# Patient Record
Sex: FEMALE | Race: WHITE | HISPANIC OR LATINO
[De-identification: names, ages, dates, MRNs, and addresses within clinical notes are randomized per-mention and may not be internally consistent; named-entity substitution may affect disease eponyms.]

---

## 2017-07-11 ENCOUNTER — APPOINTMENT (OUTPATIENT)
Dept: MAMMOGRAPHY | Facility: CLINIC | Age: 47
End: 2017-07-11

## 2017-08-22 ENCOUNTER — APPOINTMENT (OUTPATIENT)
Dept: MAMMOGRAPHY | Facility: CLINIC | Age: 47
End: 2017-08-22
Payer: COMMERCIAL

## 2017-08-22 ENCOUNTER — OUTPATIENT (OUTPATIENT)
Dept: OUTPATIENT SERVICES | Facility: HOSPITAL | Age: 47
LOS: 1 days | End: 2017-08-22
Payer: COMMERCIAL

## 2017-08-22 DIAGNOSIS — Z00.8 ENCOUNTER FOR OTHER GENERAL EXAMINATION: ICD-10-CM

## 2017-08-22 PROCEDURE — G0202: CPT | Mod: 26

## 2017-08-22 PROCEDURE — 77063 BREAST TOMOSYNTHESIS BI: CPT

## 2017-08-22 PROCEDURE — 77063 BREAST TOMOSYNTHESIS BI: CPT | Mod: 26

## 2017-08-22 PROCEDURE — 77067 SCR MAMMO BI INCL CAD: CPT

## 2017-09-05 ENCOUNTER — OUTPATIENT (OUTPATIENT)
Dept: OUTPATIENT SERVICES | Facility: HOSPITAL | Age: 47
LOS: 1 days | End: 2017-09-05
Payer: COMMERCIAL

## 2017-09-05 ENCOUNTER — APPOINTMENT (OUTPATIENT)
Dept: MAMMOGRAPHY | Facility: CLINIC | Age: 47
End: 2017-09-05
Payer: COMMERCIAL

## 2017-09-05 ENCOUNTER — APPOINTMENT (OUTPATIENT)
Dept: ULTRASOUND IMAGING | Facility: CLINIC | Age: 47
End: 2017-09-05
Payer: COMMERCIAL

## 2017-09-05 DIAGNOSIS — Z00.8 ENCOUNTER FOR OTHER GENERAL EXAMINATION: ICD-10-CM

## 2017-09-05 PROCEDURE — 77065 DX MAMMO INCL CAD UNI: CPT

## 2017-09-05 PROCEDURE — 76642 ULTRASOUND BREAST LIMITED: CPT | Mod: 26,LT

## 2017-09-05 PROCEDURE — G0279: CPT

## 2017-09-05 PROCEDURE — G0279: CPT | Mod: 26

## 2017-09-05 PROCEDURE — G0206: CPT | Mod: 26,LT

## 2017-09-05 PROCEDURE — 76642 ULTRASOUND BREAST LIMITED: CPT

## 2018-01-10 ENCOUNTER — TRANSCRIPTION ENCOUNTER (OUTPATIENT)
Age: 48
End: 2018-01-10

## 2018-01-20 ENCOUNTER — TRANSCRIPTION ENCOUNTER (OUTPATIENT)
Age: 48
End: 2018-01-20

## 2018-02-09 ENCOUNTER — OUTPATIENT (OUTPATIENT)
Dept: OUTPATIENT SERVICES | Facility: HOSPITAL | Age: 48
LOS: 1 days | End: 2018-02-09
Payer: COMMERCIAL

## 2018-02-09 ENCOUNTER — APPOINTMENT (OUTPATIENT)
Dept: RADIOLOGY | Facility: CLINIC | Age: 48
End: 2018-02-09
Payer: COMMERCIAL

## 2018-02-09 DIAGNOSIS — Z00.8 ENCOUNTER FOR OTHER GENERAL EXAMINATION: ICD-10-CM

## 2018-02-09 PROCEDURE — 71046 X-RAY EXAM CHEST 2 VIEWS: CPT | Mod: 26

## 2018-02-09 PROCEDURE — 71046 X-RAY EXAM CHEST 2 VIEWS: CPT

## 2018-05-01 ENCOUNTER — APPOINTMENT (OUTPATIENT)
Dept: MAMMOGRAPHY | Facility: CLINIC | Age: 48
End: 2018-05-01
Payer: COMMERCIAL

## 2018-05-01 ENCOUNTER — OUTPATIENT (OUTPATIENT)
Dept: OUTPATIENT SERVICES | Facility: HOSPITAL | Age: 48
LOS: 1 days | End: 2018-05-01
Payer: COMMERCIAL

## 2018-05-01 ENCOUNTER — APPOINTMENT (OUTPATIENT)
Dept: MRI IMAGING | Facility: CLINIC | Age: 48
End: 2018-05-01
Payer: COMMERCIAL

## 2018-05-01 DIAGNOSIS — Z00.8 ENCOUNTER FOR OTHER GENERAL EXAMINATION: ICD-10-CM

## 2018-05-01 PROCEDURE — G0279: CPT | Mod: 26

## 2018-05-01 PROCEDURE — 77065 DX MAMMO INCL CAD UNI: CPT | Mod: 26,LT

## 2018-05-01 PROCEDURE — G0279: CPT

## 2018-05-01 PROCEDURE — 72141 MRI NECK SPINE W/O DYE: CPT | Mod: 26

## 2018-05-01 PROCEDURE — 77065 DX MAMMO INCL CAD UNI: CPT

## 2018-05-01 PROCEDURE — 72141 MRI NECK SPINE W/O DYE: CPT

## 2018-09-14 ENCOUNTER — RESULT REVIEW (OUTPATIENT)
Age: 48
End: 2018-09-14

## 2018-12-06 ENCOUNTER — APPOINTMENT (OUTPATIENT)
Dept: ULTRASOUND IMAGING | Facility: CLINIC | Age: 48
End: 2018-12-06
Payer: COMMERCIAL

## 2018-12-06 ENCOUNTER — OUTPATIENT (OUTPATIENT)
Dept: OUTPATIENT SERVICES | Facility: HOSPITAL | Age: 48
LOS: 1 days | End: 2018-12-06
Payer: COMMERCIAL

## 2018-12-06 ENCOUNTER — APPOINTMENT (OUTPATIENT)
Dept: MAMMOGRAPHY | Facility: CLINIC | Age: 48
End: 2018-12-06
Payer: COMMERCIAL

## 2018-12-06 DIAGNOSIS — Z00.8 ENCOUNTER FOR OTHER GENERAL EXAMINATION: ICD-10-CM

## 2018-12-06 PROCEDURE — 76856 US EXAM PELVIC COMPLETE: CPT | Mod: 26

## 2018-12-06 PROCEDURE — 76856 US EXAM PELVIC COMPLETE: CPT

## 2018-12-06 PROCEDURE — 77066 DX MAMMO INCL CAD BI: CPT | Mod: 26

## 2018-12-06 PROCEDURE — G0279: CPT | Mod: 26

## 2018-12-06 PROCEDURE — 76830 TRANSVAGINAL US NON-OB: CPT

## 2018-12-06 PROCEDURE — G0279: CPT

## 2018-12-06 PROCEDURE — 76641 ULTRASOUND BREAST COMPLETE: CPT | Mod: 26,50

## 2018-12-06 PROCEDURE — 76830 TRANSVAGINAL US NON-OB: CPT | Mod: 26

## 2018-12-06 PROCEDURE — 77066 DX MAMMO INCL CAD BI: CPT

## 2018-12-06 PROCEDURE — 76641 ULTRASOUND BREAST COMPLETE: CPT

## 2019-10-10 ENCOUNTER — RESULT REVIEW (OUTPATIENT)
Age: 49
End: 2019-10-10

## 2019-10-19 ENCOUNTER — OUTPATIENT (OUTPATIENT)
Dept: OUTPATIENT SERVICES | Facility: HOSPITAL | Age: 49
LOS: 1 days | End: 2019-10-19
Payer: COMMERCIAL

## 2019-10-19 ENCOUNTER — APPOINTMENT (OUTPATIENT)
Dept: MRI IMAGING | Facility: CLINIC | Age: 49
End: 2019-10-19
Payer: COMMERCIAL

## 2019-10-19 DIAGNOSIS — Z00.8 ENCOUNTER FOR OTHER GENERAL EXAMINATION: ICD-10-CM

## 2019-10-19 PROCEDURE — 73721 MRI JNT OF LWR EXTRE W/O DYE: CPT

## 2019-10-19 PROCEDURE — 73721 MRI JNT OF LWR EXTRE W/O DYE: CPT | Mod: 26,LT

## 2020-03-26 ENCOUNTER — APPOINTMENT (OUTPATIENT)
Dept: ULTRASOUND IMAGING | Facility: CLINIC | Age: 50
End: 2020-03-26
Payer: COMMERCIAL

## 2020-03-26 ENCOUNTER — APPOINTMENT (OUTPATIENT)
Dept: MAMMOGRAPHY | Facility: CLINIC | Age: 50
End: 2020-03-26
Payer: COMMERCIAL

## 2020-03-26 ENCOUNTER — OUTPATIENT (OUTPATIENT)
Dept: OUTPATIENT SERVICES | Facility: HOSPITAL | Age: 50
LOS: 1 days | End: 2020-03-26
Payer: COMMERCIAL

## 2020-03-26 DIAGNOSIS — Z00.8 ENCOUNTER FOR OTHER GENERAL EXAMINATION: ICD-10-CM

## 2020-03-26 PROCEDURE — 77066 DX MAMMO INCL CAD BI: CPT | Mod: 26

## 2020-03-26 PROCEDURE — 76641 ULTRASOUND BREAST COMPLETE: CPT

## 2020-03-26 PROCEDURE — G0279: CPT

## 2020-03-26 PROCEDURE — G0279: CPT | Mod: 26

## 2020-03-26 PROCEDURE — 77066 DX MAMMO INCL CAD BI: CPT

## 2020-03-26 PROCEDURE — 76641 ULTRASOUND BREAST COMPLETE: CPT | Mod: 26,50

## 2020-12-27 ENCOUNTER — TRANSCRIPTION ENCOUNTER (OUTPATIENT)
Age: 50
End: 2020-12-27

## 2020-12-28 ENCOUNTER — OUTPATIENT (OUTPATIENT)
Dept: OUTPATIENT SERVICES | Facility: HOSPITAL | Age: 50
LOS: 1 days | End: 2020-12-28
Payer: COMMERCIAL

## 2020-12-28 DIAGNOSIS — Z20.828 CONTACT WITH AND (SUSPECTED) EXPOSURE TO OTHER VIRAL COMMUNICABLE DISEASES: ICD-10-CM

## 2020-12-28 LAB — SARS-COV-2 RNA SPEC QL NAA+PROBE: DETECTED

## 2020-12-28 PROCEDURE — U0003: CPT

## 2020-12-28 PROCEDURE — C9803: CPT

## 2020-12-29 DIAGNOSIS — Z20.828 CONTACT WITH AND (SUSPECTED) EXPOSURE TO OTHER VIRAL COMMUNICABLE DISEASES: ICD-10-CM

## 2021-02-12 ENCOUNTER — EMERGENCY (EMERGENCY)
Facility: HOSPITAL | Age: 51
LOS: 0 days | Discharge: HOME | End: 2021-02-12
Attending: EMERGENCY MEDICINE
Payer: COMMERCIAL

## 2021-02-12 ENCOUNTER — TRANSCRIPTION ENCOUNTER (OUTPATIENT)
Age: 51
End: 2021-02-12

## 2021-02-12 VITALS
HEART RATE: 84 BPM | TEMPERATURE: 98 F | RESPIRATION RATE: 16 BRPM | OXYGEN SATURATION: 100 % | SYSTOLIC BLOOD PRESSURE: 129 MMHG | DIASTOLIC BLOOD PRESSURE: 75 MMHG

## 2021-02-12 VITALS — DIASTOLIC BLOOD PRESSURE: 70 MMHG | SYSTOLIC BLOOD PRESSURE: 126 MMHG | HEART RATE: 71 BPM | OXYGEN SATURATION: 100 %

## 2021-02-12 DIAGNOSIS — R06.02 SHORTNESS OF BREATH: ICD-10-CM

## 2021-02-12 DIAGNOSIS — R04.2 HEMOPTYSIS: ICD-10-CM

## 2021-02-12 DIAGNOSIS — Z86.16 PERSONAL HISTORY OF COVID-19: ICD-10-CM

## 2021-02-12 LAB
ALBUMIN SERPL ELPH-MCNC: 4.1 G/DL — SIGNIFICANT CHANGE UP (ref 3.3–5)
ALP SERPL-CCNC: 83 U/L — SIGNIFICANT CHANGE UP (ref 40–120)
ALT FLD-CCNC: 60 U/L — SIGNIFICANT CHANGE UP (ref 12–78)
ANION GAP SERPL CALC-SCNC: 4 MMOL/L — LOW (ref 5–17)
APTT BLD: 34 SEC — SIGNIFICANT CHANGE UP (ref 27.5–35.5)
AST SERPL-CCNC: 35 U/L — SIGNIFICANT CHANGE UP (ref 15–37)
BASOPHILS # BLD AUTO: 0.02 K/UL — SIGNIFICANT CHANGE UP (ref 0–0.2)
BASOPHILS NFR BLD AUTO: 0.4 % — SIGNIFICANT CHANGE UP (ref 0–2)
BILIRUB SERPL-MCNC: 0.3 MG/DL — SIGNIFICANT CHANGE UP (ref 0.2–1.2)
BUN SERPL-MCNC: 11 MG/DL — SIGNIFICANT CHANGE UP (ref 7–23)
CALCIUM SERPL-MCNC: 9.2 MG/DL — SIGNIFICANT CHANGE UP (ref 8.5–10.1)
CHLORIDE SERPL-SCNC: 107 MMOL/L — SIGNIFICANT CHANGE UP (ref 96–108)
CO2 SERPL-SCNC: 29 MMOL/L — SIGNIFICANT CHANGE UP (ref 22–31)
CREAT SERPL-MCNC: 0.58 MG/DL — SIGNIFICANT CHANGE UP (ref 0.5–1.3)
EOSINOPHIL # BLD AUTO: 0.37 K/UL — SIGNIFICANT CHANGE UP (ref 0–0.5)
EOSINOPHIL NFR BLD AUTO: 6.9 % — HIGH (ref 0–6)
GLUCOSE SERPL-MCNC: 114 MG/DL — HIGH (ref 70–99)
HCT VFR BLD CALC: 39.9 % — SIGNIFICANT CHANGE UP (ref 34.5–45)
HGB BLD-MCNC: 12.4 G/DL — SIGNIFICANT CHANGE UP (ref 11.5–15.5)
IMM GRANULOCYTES NFR BLD AUTO: 0 % — SIGNIFICANT CHANGE UP (ref 0–1.5)
INR BLD: 1.02 RATIO — SIGNIFICANT CHANGE UP (ref 0.88–1.16)
LYMPHOCYTES # BLD AUTO: 1.49 K/UL — SIGNIFICANT CHANGE UP (ref 1–3.3)
LYMPHOCYTES # BLD AUTO: 27.7 % — SIGNIFICANT CHANGE UP (ref 13–44)
MCHC RBC-ENTMCNC: 26.7 PG — LOW (ref 27–34)
MCHC RBC-ENTMCNC: 31.1 GM/DL — LOW (ref 32–36)
MCV RBC AUTO: 85.8 FL — SIGNIFICANT CHANGE UP (ref 80–100)
MONOCYTES # BLD AUTO: 0.36 K/UL — SIGNIFICANT CHANGE UP (ref 0–0.9)
MONOCYTES NFR BLD AUTO: 6.7 % — SIGNIFICANT CHANGE UP (ref 2–14)
NEUTROPHILS # BLD AUTO: 3.14 K/UL — SIGNIFICANT CHANGE UP (ref 1.8–7.4)
NEUTROPHILS NFR BLD AUTO: 58.3 % — SIGNIFICANT CHANGE UP (ref 43–77)
PLATELET # BLD AUTO: 232 K/UL — SIGNIFICANT CHANGE UP (ref 150–400)
POTASSIUM SERPL-MCNC: 3.6 MMOL/L — SIGNIFICANT CHANGE UP (ref 3.5–5.3)
POTASSIUM SERPL-SCNC: 3.6 MMOL/L — SIGNIFICANT CHANGE UP (ref 3.5–5.3)
PROT SERPL-MCNC: 7.7 GM/DL — SIGNIFICANT CHANGE UP (ref 6–8.3)
PROTHROM AB SERPL-ACNC: 11.8 SEC — SIGNIFICANT CHANGE UP (ref 10.6–13.6)
RBC # BLD: 4.65 M/UL — SIGNIFICANT CHANGE UP (ref 3.8–5.2)
RBC # FLD: 15 % — HIGH (ref 10.3–14.5)
SODIUM SERPL-SCNC: 140 MMOL/L — SIGNIFICANT CHANGE UP (ref 135–145)
WBC # BLD: 5.38 K/UL — SIGNIFICANT CHANGE UP (ref 3.8–10.5)
WBC # FLD AUTO: 5.38 K/UL — SIGNIFICANT CHANGE UP (ref 3.8–10.5)

## 2021-02-12 PROCEDURE — 86901 BLOOD TYPING SEROLOGIC RH(D): CPT

## 2021-02-12 PROCEDURE — 93005 ELECTROCARDIOGRAM TRACING: CPT

## 2021-02-12 PROCEDURE — 71275 CT ANGIOGRAPHY CHEST: CPT | Mod: 26

## 2021-02-12 PROCEDURE — 36415 COLL VENOUS BLD VENIPUNCTURE: CPT

## 2021-02-12 PROCEDURE — 99283 EMERGENCY DEPT VISIT LOW MDM: CPT

## 2021-02-12 PROCEDURE — 99284 EMERGENCY DEPT VISIT MOD MDM: CPT | Mod: 25

## 2021-02-12 PROCEDURE — 93010 ELECTROCARDIOGRAM REPORT: CPT

## 2021-02-12 PROCEDURE — 80053 COMPREHEN METABOLIC PANEL: CPT

## 2021-02-12 PROCEDURE — 85025 COMPLETE CBC W/AUTO DIFF WBC: CPT

## 2021-02-12 PROCEDURE — 86900 BLOOD TYPING SEROLOGIC ABO: CPT

## 2021-02-12 PROCEDURE — 86850 RBC ANTIBODY SCREEN: CPT

## 2021-02-12 PROCEDURE — 71275 CT ANGIOGRAPHY CHEST: CPT

## 2021-02-12 PROCEDURE — 85730 THROMBOPLASTIN TIME PARTIAL: CPT

## 2021-02-12 PROCEDURE — 85610 PROTHROMBIN TIME: CPT

## 2021-02-12 NOTE — ED STATDOCS - TOBACCO USE
Detail Level: Detailed
Quality 110: Preventive Care And Screening: Influenza Immunization: Influenza immunization was not ordered or administered, reason not given
Quality 137: Melanoma: Continuity Of Care - Recall System: Patient information entered into a recall system that includes: target date for the next exam specified AND a process to follow up with patients regarding missed or unscheduled appointments
Quality 224: Stage 0-Iic Melanoma: Overutilization Of Imaging Studies For Only Stage 0-Iic Melanoma: Patient had one or more of the following imaging studies (chest X-ray, CT, Ultrasound, MRI, PET, nuclear medicine scan), for a clinical trial enrollment, ordered by another provider or other system reasons
Quality 138: Melanoma: Coordination Of Care: A treatment plan was communicated to the physicians providing continuing care within one month of diagnosis outlining: diagnosis, tumor thickness and a plan for surgery or alternate care.
Unknown if ever smoked

## 2021-02-12 NOTE — ED ADULT NURSE NOTE - OBJECTIVE STATEMENT
Pt c/o having 1 episode of vomiting blood yesterday. Pt with no complaints at this time, denies fever, chest pain, shortness of breath, dizziness. Pt alert and oriented x4, able to ambulate without assistance.

## 2021-02-12 NOTE — ED STATDOCS - PATIENT PORTAL LINK FT
You can access the FollowMyHealth Patient Portal offered by [facility] by registering at the following website: [email or web address]. By joining [facility]’s FollowMyHealth portal, you will also be able to view your health information using other applications (apps) compatible with our system.

## 2021-02-12 NOTE — ED STATDOCS - CARE PROVIDER_API CALL
TANJA Huitron (DO)  Pulmonary Disease; Sleep Medicine  [address] [place] [address]  Phone: [phone]  Fax: [phone]  Follow Up Time:

## 2021-02-12 NOTE — ED STATDOCS - NSFOLLOWUPINSTRUCTIONS_ED_ALL_ED_FT
Hemoptisis    Hemoptysis       Hemoptisis se refiere a expulsar deedee al toser. Puede ser leve o grave. Si tiene hemoptisis leve, podría expulsar mucosidad (esputo) y saliva con vetas de deedee al toser cuando tiene aliza infección en la nariz, la garganta o los pulmones (vías respiratorias). Expulsar 1 o 2 tazas (de 240 a 480 ml) de deedee al toser en el transcurso de 24 horas (hemoptisis masiva) es un sb de emergencia médica.  La causa más frecuente de la hemoptisis es aliza infección en las vías respiratorias, bhanu bronquitis o neumonía. Otras causas frecuentes incluyen lo siguiente:  •Un tumor pulmonar o un tumor en las vías respiratorias superiores.      •Aliza afección que dañe los grandes conductos de aire (bronquiectasia).      •Un coágulo de deedee en los pulmones (embolia pulmonar).      •Aliza afección que no permita que la deedee coagule con normalidad.      •Aspiración (inhalación) de un cuerpo extraño pequeño.      Algunas veces la causa no se conoce. La hemoptisis puede ser un indicio de aliza afección leve o grave, por lo que es importante que consulte con khan médico.      Siga estas indicaciones en khan casa:    •Controle khan afección para detectar cualquier cambio.      •Kane los medicamentos de venta devonte y los recetados solamente bhanu se lo haya indicado el médico.      •Si le recetaron un antibiótico, tómelo bhanu se lo haya indicado el médico. No deje de brodie el antibiótico aunque comience a sentirse mejor.      •Retome terry actividades normales bhanu se lo haya indicado el médico. Pregúntele al médico qué actividades son seguras para usted.      • No consuma ningún producto que contenga nicotina o tabaco, bhanu cigarrillos y cigarrillos electrónicos. Si necesita ayuda para dejar de fumar, consulte al médico.      •Concurra a todas las visitas de control bhanu se lo haya indicado el médico. [place] es importante.        Comuníquese con un médico si:    •Tiene fiebre.      •Expulsa esputo con vetas de deedee (teñido de deedee) al toser.        Solicite ayuda de inmediato si:    •Expectora deedee fresca o coágulos de deedee.      •Tiene dificultad para respirar.      •Siente dolor en el pecho.      Esta información no tiene bhanu fin reemplazar el consejo del médico. Asegúrese de hacerle al médico cualquier pregunta que tenga.

## 2021-02-12 NOTE — ED STATDOCS - OBJECTIVE STATEMENT
51 y/o F with PMHx of COVID-19 (+ 12/26/20) presents to the ED sent from  c/o episode of +hemoptysis yesterday with associated +SOB. Has no symptoms today. No edema, chest pain, or fever. Denies TB contacts. Not on AC.

## 2021-02-12 NOTE — ED STATDOCS - CLINICAL SUMMARY MEDICAL DECISION MAKING FREE TEXT BOX
CT negative for PE.  No events in ED.  Non specific airway disease no consolidation.  Return precautions discussed.   Pt. understands need to Follow up with PMD.   # [id number] used for discharge.  Kenia Rodriguez PA-C

## 2021-02-12 NOTE — ED ADULT NURSE NOTE - CHPI ED NUR SYMPTOMS NEG
no back pain/no chest pain/no chills/no congestion/no diaphoresis/no dizziness/no fever/no nausea/no shortness of breath/no syncope

## 2021-02-12 NOTE — ED STATDOCS - PROGRESS NOTE DETAILS
49 y/o F with PMHx of COVID-19 (+ 12/26/20) presents to the ED sent from  c/o episode of +hemoptysis yesterday with associated +SOB. Has no symptoms today. No edema, chest pain, or fever. Denies TB contacts. Not on AC.  Pt. to have labs, CTA rule out PE, reassess.  Kenia Rodriguez PA-C CT negative for PE.  No events in ED.  Non specific CT negative for PE.  No events in ED.  Non specific airway disease no consolidation.  Return precautions discussed.   Pt. understands need to Follow up with PMD.   # [id number] used for discharge.  Kenia Rodriguez PA-C

## 2021-02-20 PROBLEM — U07.1 COVID-19: Chronic | Status: ACTIVE | Noted: 2021-02-12

## 2021-02-26 ENCOUNTER — APPOINTMENT (OUTPATIENT)
Dept: ULTRASOUND IMAGING | Facility: CLINIC | Age: 51
End: 2021-02-26
Payer: COMMERCIAL

## 2021-02-26 ENCOUNTER — OUTPATIENT (OUTPATIENT)
Dept: OUTPATIENT SERVICES | Facility: HOSPITAL | Age: 51
LOS: 1 days | End: 2021-02-26
Payer: COMMERCIAL

## 2021-02-26 DIAGNOSIS — Z00.8 ENCOUNTER FOR OTHER GENERAL EXAMINATION: ICD-10-CM

## 2021-02-26 PROCEDURE — 76830 TRANSVAGINAL US NON-OB: CPT | Mod: 26

## 2021-02-26 PROCEDURE — 76700 US EXAM ABDOM COMPLETE: CPT | Mod: 26

## 2021-02-26 PROCEDURE — 76856 US EXAM PELVIC COMPLETE: CPT | Mod: 26

## 2021-02-26 PROCEDURE — 76830 TRANSVAGINAL US NON-OB: CPT

## 2021-02-26 PROCEDURE — 76700 US EXAM ABDOM COMPLETE: CPT

## 2021-02-26 PROCEDURE — 76856 US EXAM PELVIC COMPLETE: CPT

## 2021-04-02 ENCOUNTER — OUTPATIENT (OUTPATIENT)
Dept: OUTPATIENT SERVICES | Facility: HOSPITAL | Age: 51
LOS: 1 days | End: 2021-04-02

## 2021-04-02 ENCOUNTER — APPOINTMENT (OUTPATIENT)
Dept: MAMMOGRAPHY | Facility: CLINIC | Age: 51
End: 2021-04-02

## 2021-04-02 DIAGNOSIS — Z00.8 ENCOUNTER FOR OTHER GENERAL EXAMINATION: ICD-10-CM

## 2021-11-08 ENCOUNTER — NON-APPOINTMENT (OUTPATIENT)
Age: 51
End: 2021-11-08

## 2021-11-08 ENCOUNTER — APPOINTMENT (OUTPATIENT)
Dept: FAMILY MEDICINE | Facility: CLINIC | Age: 51
End: 2021-11-08
Payer: COMMERCIAL

## 2021-11-08 VITALS
DIASTOLIC BLOOD PRESSURE: 82 MMHG | BODY MASS INDEX: 30.44 KG/M2 | HEIGHT: 58 IN | WEIGHT: 145 LBS | SYSTOLIC BLOOD PRESSURE: 134 MMHG | TEMPERATURE: 98.1 F | HEART RATE: 71 BPM | OXYGEN SATURATION: 99 %

## 2021-11-08 DIAGNOSIS — Z78.9 OTHER SPECIFIED HEALTH STATUS: ICD-10-CM

## 2021-11-08 DIAGNOSIS — Z83.3 FAMILY HISTORY OF DIABETES MELLITUS: ICD-10-CM

## 2021-11-08 PROCEDURE — 90686 IIV4 VACC NO PRSV 0.5 ML IM: CPT

## 2021-11-08 PROCEDURE — G0008: CPT

## 2021-11-08 PROCEDURE — 99204 OFFICE O/P NEW MOD 45 MIN: CPT | Mod: 25

## 2021-11-08 NOTE — HEALTH RISK ASSESSMENT
[1 or 2 (0 pts)] : 1 or 2 (0 points) [Never (0 pts)] : Never (0 points) [No] : In the past 12 months have you used drugs other than those required for medical reasons? No [No falls in past year] : Patient reported no falls in the past year [0] : 2) Feeling down, depressed, or hopeless: Not at all (0) [] : No [Audit-CScore] : 0 [DAF8Iiaaw] : 0

## 2021-12-21 ENCOUNTER — NON-APPOINTMENT (OUTPATIENT)
Age: 51
End: 2021-12-21

## 2021-12-21 ENCOUNTER — APPOINTMENT (OUTPATIENT)
Dept: FAMILY MEDICINE | Facility: CLINIC | Age: 51
End: 2021-12-21
Payer: COMMERCIAL

## 2021-12-21 VITALS
HEIGHT: 58 IN | OXYGEN SATURATION: 100 % | BODY MASS INDEX: 30.23 KG/M2 | TEMPERATURE: 97.9 F | DIASTOLIC BLOOD PRESSURE: 77 MMHG | SYSTOLIC BLOOD PRESSURE: 120 MMHG | HEART RATE: 66 BPM | WEIGHT: 144 LBS

## 2021-12-21 LAB
BILIRUB UR QL STRIP: NORMAL
CLARITY UR: CLEAR
COLLECTION METHOD: NORMAL
GLUCOSE UR-MCNC: NORMAL
HCG UR QL: 0.2 EU/DL
HGB UR QL STRIP.AUTO: NORMAL
KETONES UR-MCNC: NORMAL
LEUKOCYTE ESTERASE UR QL STRIP: NORMAL
NITRITE UR QL STRIP: NORMAL
PH UR STRIP: 5.5
PROT UR STRIP-MCNC: NORMAL
SP GR UR STRIP: 1.02

## 2021-12-21 PROCEDURE — 99173 VISUAL ACUITY SCREEN: CPT

## 2021-12-21 PROCEDURE — 86580 TB INTRADERMAL TEST: CPT

## 2021-12-21 PROCEDURE — 90471 IMMUNIZATION ADMIN: CPT

## 2021-12-21 PROCEDURE — 93000 ELECTROCARDIOGRAM COMPLETE: CPT

## 2021-12-21 PROCEDURE — 36415 COLL VENOUS BLD VENIPUNCTURE: CPT

## 2021-12-21 PROCEDURE — 99396 PREV VISIT EST AGE 40-64: CPT | Mod: 25

## 2021-12-21 PROCEDURE — 92551 PURE TONE HEARING TEST AIR: CPT

## 2021-12-21 PROCEDURE — 81003 URINALYSIS AUTO W/O SCOPE: CPT | Mod: QW

## 2021-12-21 PROCEDURE — 90715 TDAP VACCINE 7 YRS/> IM: CPT

## 2021-12-21 NOTE — HEALTH RISK ASSESSMENT
[Never] : Never [Yes] : Yes [Monthly or less (1 pt)] : Monthly or less (1 point) [1 or 2 (0 pts)] : 1 or 2 (0 points) [Never (0 pts)] : Never (0 points) [No] : In the past 12 months have you used drugs other than those required for medical reasons? No [No falls in past year] : Patient reported no falls in the past year [0] : 2) Feeling down, depressed, or hopeless: Not at all (0) [HIV Test offered] : HIV Test offered [Hepatitis C test offered] : Hepatitis C test offered [With Family] : lives with family [# of Members in Household ___] :  household currently consist of [unfilled] member(s) [Employed] : employed [High School] : high school [] :  [# Of Children ___] : has [unfilled] children [Sexually Active] : sexually active [Feels Safe at Home] : Feels safe at home [Fully functional (bathing, dressing, toileting, transferring, walking, feeding)] : Fully functional (bathing, dressing, toileting, transferring, walking, feeding) [Fully functional (using the telephone, shopping, preparing meals, housekeeping, doing laundry, using] : Fully functional and needs no help or supervision to perform IADLs (using the telephone, shopping, preparing meals, housekeeping, doing laundry, using transportation, managing medications and managing finances) [Reports changes in vision] : Reports changes in vision [Reports normal functional visual acuity (ie: able to read med bottle)] : Reports normal functional visual acuity [Smoke Detector] : smoke detector [Carbon Monoxide Detector] : carbon monoxide detector [Safety elements used in home] : safety elements used in home [Seat Belt] :  uses seat belt [Travel to Developing Areas] : travel to developing areas [Very Good] : ~his/her~  mood as very good [Audit-CScore] : 1 [PMK8Brzhj] : 0 [Patient reported mammogram was normal] : Patient reported mammogram was normal [Patient reported PAP Smear was normal] : Patient reported PAP Smear was normal [Patient reported colonoscopy was normal] : Patient reported colonoscopy was normal [Change in mental status noted] : No change in mental status noted [Language] : denies difficulty with language [Behavior] : denies difficulty with behavior [Learning/Retaining New Information] : denies difficulty learning/retaining new information [Handling Complex Tasks] : denies difficulty handling complex tasks [Reasoning] : denies difficulty with reasoning [Spatial Ability and Orientation] : denies difficulty with spatial ability and orientation [Reports changes in hearing] : Reports no changes in hearing [Reports changes in dental health] : Reports no changes in dental health [Guns at Home] : no guns at home [Sunscreen] : does not use sunscreen [TB Exposure] : is not being exposed to tuberculosis [Caregiver Concerns] : does not have caregiver concerns [MammogramDate] : 01/20 [PapSmearDate] : 01/20 [ColonoscopyDate] : 01/17 [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

## 2021-12-21 NOTE — PHYSICAL EXAM
[20/___] : left eye 20/[unfilled] [Normal] : affect was normal and insight and judgment were intact [FreeTextEntry1] : Right\par 0.5-40\par 1-35\par 2-25\par 4-25\par \par \par Left\par 0.5-0\par 1-45\par 2-30\par 4-30\par

## 2021-12-21 NOTE — COUNSELING
[Fall prevention counseling provided] : Fall prevention counseling provided [Behavioral health counseling provided] : Behavioral health counseling provided [Potential consequences of obesity discussed] : Potential consequences of obesity discussed [Benefits of weight loss discussed] : Benefits of weight loss discussed [Encouraged to increase physical activity] : Encouraged to increase physical activity [Encouraged to use exercise tracking device] : Encouraged to use exercise tracking device [Good understanding] : Patient has a good understanding of disease, goals and obesity follow-up plan

## 2021-12-22 LAB
ALBUMIN SERPL ELPH-MCNC: 4.8 G/DL
ALP BLD-CCNC: 84 U/L
ALT SERPL-CCNC: 22 U/L
ANION GAP SERPL CALC-SCNC: 11 MMOL/L
AST SERPL-CCNC: 22 U/L
BASOPHILS # BLD AUTO: 0.04 K/UL
BASOPHILS NFR BLD AUTO: 0.8 %
BILIRUB SERPL-MCNC: 0.3 MG/DL
BUN SERPL-MCNC: 10 MG/DL
CALCIUM SERPL-MCNC: 9.3 MG/DL
CHLORIDE SERPL-SCNC: 104 MMOL/L
CHOLEST SERPL-MCNC: 157 MG/DL
CO2 SERPL-SCNC: 26 MMOL/L
CREAT SERPL-MCNC: 0.62 MG/DL
EOSINOPHIL # BLD AUTO: 0.67 K/UL
EOSINOPHIL NFR BLD AUTO: 13.6 %
GLUCOSE SERPL-MCNC: 93 MG/DL
HCT VFR BLD CALC: 42 %
HCV AB SER QL: NONREACTIVE
HCV S/CO RATIO: 0.09 S/CO
HDLC SERPL-MCNC: 46 MG/DL
HGB BLD-MCNC: 12.6 G/DL
HIV1+2 AB SPEC QL IA.RAPID: NONREACTIVE
IMM GRANULOCYTES NFR BLD AUTO: 0.2 %
LDLC SERPL CALC-MCNC: 94 MG/DL
LYMPHOCYTES # BLD AUTO: 1.21 K/UL
LYMPHOCYTES NFR BLD AUTO: 24.5 %
MAN DIFF?: NORMAL
MCHC RBC-ENTMCNC: 26.2 PG
MCHC RBC-ENTMCNC: 30 GM/DL
MCV RBC AUTO: 87.3 FL
MONOCYTES # BLD AUTO: 0.28 K/UL
MONOCYTES NFR BLD AUTO: 5.7 %
NEUTROPHILS # BLD AUTO: 2.72 K/UL
NEUTROPHILS NFR BLD AUTO: 55.2 %
NONHDLC SERPL-MCNC: 111 MG/DL
PLATELET # BLD AUTO: 236 K/UL
POTASSIUM SERPL-SCNC: 4 MMOL/L
PROT SERPL-MCNC: 7.1 G/DL
RBC # BLD: 4.81 M/UL
RBC # FLD: 14.1 %
SODIUM SERPL-SCNC: 141 MMOL/L
T3FREE SERPL-MCNC: 3.93 PG/ML
T4 FREE SERPL-MCNC: 1.3 NG/DL
TRIGL SERPL-MCNC: 85 MG/DL
TSH SERPL-ACNC: 1.06 UIU/ML
WBC # FLD AUTO: 4.93 K/UL

## 2022-01-17 ENCOUNTER — OUTPATIENT (OUTPATIENT)
Dept: OUTPATIENT SERVICES | Facility: HOSPITAL | Age: 52
LOS: 1 days | End: 2022-01-17
Payer: COMMERCIAL

## 2022-01-17 ENCOUNTER — APPOINTMENT (OUTPATIENT)
Dept: MAMMOGRAPHY | Facility: CLINIC | Age: 52
End: 2022-01-17
Payer: COMMERCIAL

## 2022-01-17 ENCOUNTER — RESULT REVIEW (OUTPATIENT)
Age: 52
End: 2022-01-17

## 2022-01-17 DIAGNOSIS — Z00.00 ENCOUNTER FOR GENERAL ADULT MEDICAL EXAMINATION WITHOUT ABNORMAL FINDINGS: ICD-10-CM

## 2022-01-17 DIAGNOSIS — Z00.8 ENCOUNTER FOR OTHER GENERAL EXAMINATION: ICD-10-CM

## 2022-01-17 PROCEDURE — 77063 BREAST TOMOSYNTHESIS BI: CPT

## 2022-01-17 PROCEDURE — 77063 BREAST TOMOSYNTHESIS BI: CPT | Mod: 26

## 2022-01-17 PROCEDURE — 77067 SCR MAMMO BI INCL CAD: CPT

## 2022-01-17 PROCEDURE — 77067 SCR MAMMO BI INCL CAD: CPT | Mod: 26

## 2022-01-28 ENCOUNTER — RX RENEWAL (OUTPATIENT)
Age: 52
End: 2022-01-28

## 2022-05-08 ENCOUNTER — RX RENEWAL (OUTPATIENT)
Age: 52
End: 2022-05-08

## 2022-05-11 ENCOUNTER — RX RENEWAL (OUTPATIENT)
Age: 52
End: 2022-05-11

## 2022-05-25 ENCOUNTER — APPOINTMENT (OUTPATIENT)
Dept: FAMILY MEDICINE | Facility: CLINIC | Age: 52
End: 2022-05-25
Payer: COMMERCIAL

## 2022-05-25 VITALS
OXYGEN SATURATION: 100 % | WEIGHT: 144 LBS | TEMPERATURE: 98 F | SYSTOLIC BLOOD PRESSURE: 133 MMHG | BODY MASS INDEX: 30.1 KG/M2 | DIASTOLIC BLOOD PRESSURE: 82 MMHG | HEART RATE: 75 BPM

## 2022-05-25 PROCEDURE — 99213 OFFICE O/P EST LOW 20 MIN: CPT

## 2022-05-25 NOTE — HISTORY OF PRESENT ILLNESS
[Hypertension] : Hypertension [FreeTextEntry6] : pt is here for med renewal [Does not check BP] : The patient is not checking blood pressure [<140/90] : Target blood pressure is <140/90 [Target goal met] : BP target goal met

## 2022-06-05 ENCOUNTER — NON-APPOINTMENT (OUTPATIENT)
Age: 52
End: 2022-06-05

## 2022-07-24 ENCOUNTER — NON-APPOINTMENT (OUTPATIENT)
Age: 52
End: 2022-07-24

## 2022-07-26 ENCOUNTER — NON-APPOINTMENT (OUTPATIENT)
Age: 52
End: 2022-07-26

## 2022-07-31 ENCOUNTER — NON-APPOINTMENT (OUTPATIENT)
Age: 52
End: 2022-07-31

## 2022-08-11 ENCOUNTER — APPOINTMENT (OUTPATIENT)
Dept: FAMILY MEDICINE | Facility: CLINIC | Age: 52
End: 2022-08-11

## 2022-08-11 VITALS
DIASTOLIC BLOOD PRESSURE: 80 MMHG | WEIGHT: 144 LBS | SYSTOLIC BLOOD PRESSURE: 122 MMHG | HEIGHT: 58 IN | BODY MASS INDEX: 30.23 KG/M2

## 2022-08-11 PROCEDURE — 99213 OFFICE O/P EST LOW 20 MIN: CPT

## 2022-08-11 NOTE — ASSESSMENT
[FreeTextEntry1] : F/u in 6-12 mos for dose number 2\par Routine travel and water precautions discussed

## 2022-08-11 NOTE — HISTORY OF PRESENT ILLNESS
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

## 2022-09-08 ENCOUNTER — APPOINTMENT (OUTPATIENT)
Dept: FAMILY MEDICINE | Facility: CLINIC | Age: 52
End: 2022-09-08

## 2022-10-25 NOTE — ED STATDOCS - SKIN, MLM
skin normal color for race, warm, dry and intact.
n/a at this time pt Elevated BP and R nare bleed minimal/unable to perform

## 2022-11-07 ENCOUNTER — RX RENEWAL (OUTPATIENT)
Age: 52
End: 2022-11-07

## 2022-11-21 ENCOUNTER — NON-APPOINTMENT (OUTPATIENT)
Age: 52
End: 2022-11-21

## 2022-12-06 ENCOUNTER — APPOINTMENT (OUTPATIENT)
Dept: ORTHOPEDIC SURGERY | Facility: CLINIC | Age: 52
End: 2022-12-06

## 2022-12-06 ENCOUNTER — NON-APPOINTMENT (OUTPATIENT)
Age: 52
End: 2022-12-06

## 2022-12-06 PROCEDURE — 99204 OFFICE O/P NEW MOD 45 MIN: CPT

## 2022-12-06 NOTE — HISTORY OF PRESENT ILLNESS
[de-identified] : Patient presents with left knee pain.  States it occurred on around 1120 when she was playing with her son.  States her knee hit into the stairs.  Since then has gotten much better.  Still complaining of pain however around the knee.  Does state it does radiate down her legs to her foot.  Also has numbness and tingling around the knee.  Does complain of some swelling around the knee.  Takes Aleve which seems to help a bit.  Denies any prior injuries or treatments.  Past medical significant for hypertension.

## 2022-12-06 NOTE — PHYSICAL EXAM
[de-identified] : General Appearance: normal without acute distress\par Mental: Alert and oriented x 3\par Psych/affect: appropriate, cooperative\par Gait: [normal] gait\par \par Nontender palpation low back\par Left hip no pain with internal rotation, full range of motion\par \par Left knee\par Inspection: no effusion, no erythema.\par Wounds: none.\par Alignment: neutral.\par Palpation: Tender palpation medial joint line.\par ROM active (in degrees): 0-[120] without crepitus or pain through the arc of motion.  Positive straight leg raise\par Ligamentous laxity: stable to varus stress test, stable to valgus stress test. Negative Lachman's test\par Meniscal Test: Positive McMurrays, mildly positive Kassandra.\par Muscle Test: good quad strength. [de-identified] : Left knee x-rays taken in the hospital reviewed interpreted–mild medial joint space narrowing with small osteophytes, mild osteoarthritis

## 2022-12-06 NOTE — DISCUSSION/SUMMARY
[de-identified] : Left lumbar radiculopathy, left knee pain, possible meniscus tear\par \par Extensive conversation about this issue with the patient.  Due to the constellation of symptoms, I prescribed a Medrol Dosepak to try to calm the acute inflammation possibly coming from her back as well.  If this pain continues with anti-inflammatories after a month, she should follow-up and we will reevaluate to see if has become more isolated.  Encouraged light physical activity.  All questions answered.

## 2022-12-08 ENCOUNTER — APPOINTMENT (OUTPATIENT)
Dept: FAMILY MEDICINE | Facility: CLINIC | Age: 52
End: 2022-12-08

## 2022-12-08 VITALS
SYSTOLIC BLOOD PRESSURE: 110 MMHG | TEMPERATURE: 98.1 F | DIASTOLIC BLOOD PRESSURE: 60 MMHG | HEART RATE: 83 BPM | WEIGHT: 144 LBS | BODY MASS INDEX: 30.23 KG/M2 | OXYGEN SATURATION: 98 % | HEIGHT: 58 IN

## 2022-12-08 PROCEDURE — 90686 IIV4 VACC NO PRSV 0.5 ML IM: CPT

## 2022-12-08 PROCEDURE — 99213 OFFICE O/P EST LOW 20 MIN: CPT | Mod: 25

## 2022-12-08 PROCEDURE — G0008: CPT

## 2022-12-08 RX ORDER — NAPROXEN 500 MG/1
500 TABLET ORAL
Qty: 20 | Refills: 0 | Status: DISCONTINUED | COMMUNITY
Start: 2022-11-22

## 2022-12-08 RX ORDER — NIRMATRELVIR AND RITONAVIR 300-100 MG
20 X 150 MG & KIT ORAL
Qty: 30 | Refills: 0 | Status: DISCONTINUED | COMMUNITY
Start: 2022-07-27

## 2022-12-08 RX ORDER — METHYLPREDNISOLONE 4 MG/1
4 TABLET ORAL
Qty: 1 | Refills: 0 | Status: DISCONTINUED | COMMUNITY
Start: 2022-12-06 | End: 2022-12-08

## 2022-12-08 NOTE — HISTORY OF PRESENT ILLNESS
[FreeTextEntry6] : Pt is here for BP check. [<140/90] : Target blood pressure is <140/90 [Target goal met] : BP target goal met

## 2023-01-21 ENCOUNTER — NON-APPOINTMENT (OUTPATIENT)
Age: 53
End: 2023-01-21

## 2023-01-21 ENCOUNTER — APPOINTMENT (OUTPATIENT)
Dept: FAMILY MEDICINE | Facility: CLINIC | Age: 53
End: 2023-01-21
Payer: COMMERCIAL

## 2023-01-21 VITALS
DIASTOLIC BLOOD PRESSURE: 70 MMHG | SYSTOLIC BLOOD PRESSURE: 110 MMHG | OXYGEN SATURATION: 95 % | HEIGHT: 58 IN | BODY MASS INDEX: 28.76 KG/M2 | HEART RATE: 75 BPM | TEMPERATURE: 97.9 F | WEIGHT: 137 LBS

## 2023-01-21 DIAGNOSIS — Z23 ENCOUNTER FOR IMMUNIZATION: ICD-10-CM

## 2023-01-21 PROCEDURE — 99396 PREV VISIT EST AGE 40-64: CPT | Mod: 25

## 2023-01-21 PROCEDURE — 81003 URINALYSIS AUTO W/O SCOPE: CPT | Mod: QW

## 2023-01-21 PROCEDURE — 93000 ELECTROCARDIOGRAM COMPLETE: CPT

## 2023-01-21 PROCEDURE — 36415 COLL VENOUS BLD VENIPUNCTURE: CPT

## 2023-01-21 PROCEDURE — 92551 PURE TONE HEARING TEST AIR: CPT

## 2023-01-21 NOTE — HEALTH RISK ASSESSMENT
[Never] : Never [1 or 2 (0 pts)] : 1 or 2 (0 points) [Never (0 pts)] : Never (0 points) [No] : In the past 12 months have you used drugs other than those required for medical reasons? No [No falls in past year] : Patient reported no falls in the past year [0] : 2) Feeling down, depressed, or hopeless: Not at all (0) [PHQ-2 Negative - No further assessment needed] : PHQ-2 Negative - No further assessment needed [Patient reported mammogram was normal] : Patient reported mammogram was normal [HIV test declined] : HIV test declined [Hepatitis C test declined] : Hepatitis C test declined [With Family] : lives with family [# of Members in Household ___] :  household currently consist of [unfilled] member(s) [Employed] : employed [College] : College [] :  [# Of Children ___] : has [unfilled] children [Sexually Active] : sexually active [Feels Safe at Home] : Feels safe at home [Fully functional (bathing, dressing, toileting, transferring, walking, feeding)] : Fully functional (bathing, dressing, toileting, transferring, walking, feeding) [Fully functional (using the telephone, shopping, preparing meals, housekeeping, doing laundry, using] : Fully functional and needs no help or supervision to perform IADLs (using the telephone, shopping, preparing meals, housekeeping, doing laundry, using transportation, managing medications and managing finances) [Reports normal functional visual acuity (ie: able to read med bottle)] : Reports normal functional visual acuity [Smoke Detector] : smoke detector [Carbon Monoxide Detector] : carbon monoxide detector [Safety elements used in home] : safety elements used in home [Seat Belt] :  uses seat belt [Travel to Developing Areas] : travel to developing areas [Very Good] : ~his/her~  mood as very good [Audit-CScore] : 0 [LDX8Copgr] : 0 [Change in mental status noted] : No change in mental status noted [Language] : denies difficulty with language [Behavior] : denies difficulty with behavior [Learning/Retaining New Information] : denies difficulty learning/retaining new information [Handling Complex Tasks] : denies difficulty handling complex tasks [Reasoning] : denies difficulty with reasoning [Spatial Ability and Orientation] : denies difficulty with spatial ability and orientation [Reports changes in hearing] : Reports no changes in hearing [Reports changes in vision] : Reports no changes in vision [Reports changes in dental health] : Reports no changes in dental health [Guns at Home] : no guns at home [Sunscreen] : does not use sunscreen [TB Exposure] : is not being exposed to tuberculosis [Caregiver Concerns] : does not have caregiver concerns [MammogramDate] : 01/22 [PapSmearComments] : unsure [ColonoscopyComments] : unsure

## 2023-01-21 NOTE — PHYSICAL EXAM
[No Acute Distress] : no acute distress [Well Nourished] : well nourished [Well Developed] : well developed [Well-Appearing] : well-appearing [Normal Sclera/Conjunctiva] : normal sclera/conjunctiva [PERRL] : pupils equal round and reactive to light [EOMI] : extraocular movements intact [Normal Outer Ear/Nose] : the outer ears and nose were normal in appearance [Normal Oropharynx] : the oropharynx was normal [No JVD] : no jugular venous distention [No Lymphadenopathy] : no lymphadenopathy [Supple] : supple [Thyroid Normal, No Nodules] : the thyroid was normal and there were no nodules present [No Respiratory Distress] : no respiratory distress  [No Accessory Muscle Use] : no accessory muscle use [Clear to Auscultation] : lungs were clear to auscultation bilaterally [Normal Rate] : normal rate  [Regular Rhythm] : with a regular rhythm [Normal S1, S2] : normal S1 and S2 [No Murmur] : no murmur heard [No Carotid Bruits] : no carotid bruits [No Abdominal Bruit] : a ~M bruit was not heard ~T in the abdomen [No Varicosities] : no varicosities [Pedal Pulses Present] : the pedal pulses are present [No Edema] : there was no peripheral edema [No Palpable Aorta] : no palpable aorta [No Extremity Clubbing/Cyanosis] : no extremity clubbing/cyanosis [Soft] : abdomen soft [Non Tender] : non-tender [Non-distended] : non-distended [No Masses] : no abdominal mass palpated [No HSM] : no HSM [Normal Bowel Sounds] : normal bowel sounds [Normal Posterior Cervical Nodes] : no posterior cervical lymphadenopathy [Normal Anterior Cervical Nodes] : no anterior cervical lymphadenopathy [No CVA Tenderness] : no CVA  tenderness [No Spinal Tenderness] : no spinal tenderness [No Joint Swelling] : no joint swelling [Grossly Normal Strength/Tone] : grossly normal strength/tone [No Rash] : no rash [Coordination Grossly Intact] : coordination grossly intact [No Focal Deficits] : no focal deficits [Normal Gait] : normal gait [Deep Tendon Reflexes (DTR)] : deep tendon reflexes were 2+ and symmetric [Normal Affect] : the affect was normal [Normal Insight/Judgement] : insight and judgment were intact [FreeTextEntry1] : .\par Right Ear\par \par 0.5-50\par 1-35\par 2-30\par 4-25\par \par Left Ear\par \par 0.5-50\par 1-30\par 2-20\par 4-30\par

## 2023-01-22 LAB
ALBUMIN SERPL ELPH-MCNC: 4.6 G/DL
ALP BLD-CCNC: 95 U/L
ALT SERPL-CCNC: 16 U/L
ANION GAP SERPL CALC-SCNC: 11 MMOL/L
AST SERPL-CCNC: 14 U/L
BASOPHILS # BLD AUTO: 0.05 K/UL
BASOPHILS NFR BLD AUTO: 1 %
BILIRUB SERPL-MCNC: 0.2 MG/DL
BILIRUB UR QL STRIP: NORMAL
BUN SERPL-MCNC: 10 MG/DL
CALCIUM SERPL-MCNC: 9.6 MG/DL
CHLORIDE SERPL-SCNC: 102 MMOL/L
CHOLEST SERPL-MCNC: 154 MG/DL
CLARITY UR: CLEAR
CO2 SERPL-SCNC: 25 MMOL/L
COLLECTION METHOD: NORMAL
CREAT SERPL-MCNC: 0.54 MG/DL
EGFR: 110 ML/MIN/1.73M2
EOSINOPHIL # BLD AUTO: 0.73 K/UL
EOSINOPHIL NFR BLD AUTO: 14.8 %
GLUCOSE SERPL-MCNC: 88 MG/DL
GLUCOSE UR-MCNC: NORMAL
HCG UR QL: 0.2 EU/DL
HCT VFR BLD CALC: 40.5 %
HDLC SERPL-MCNC: 51 MG/DL
HGB BLD-MCNC: 12.8 G/DL
HGB UR QL STRIP.AUTO: ABNORMAL
IMM GRANULOCYTES NFR BLD AUTO: 0.2 %
KETONES UR-MCNC: NORMAL
LDLC SERPL CALC-MCNC: 83 MG/DL
LEUKOCYTE ESTERASE UR QL STRIP: ABNORMAL
LYMPHOCYTES # BLD AUTO: 1.31 K/UL
LYMPHOCYTES NFR BLD AUTO: 26.5 %
MAN DIFF?: NORMAL
MCHC RBC-ENTMCNC: 26.8 PG
MCHC RBC-ENTMCNC: 31.6 GM/DL
MCV RBC AUTO: 84.7 FL
MONOCYTES # BLD AUTO: 0.29 K/UL
MONOCYTES NFR BLD AUTO: 5.9 %
NEUTROPHILS # BLD AUTO: 2.55 K/UL
NEUTROPHILS NFR BLD AUTO: 51.6 %
NITRITE UR QL STRIP: NORMAL
NONHDLC SERPL-MCNC: 103 MG/DL
PH UR STRIP: 5.5
PLATELET # BLD AUTO: 222 K/UL
POTASSIUM SERPL-SCNC: 4 MMOL/L
PROT SERPL-MCNC: 7 G/DL
PROT UR STRIP-MCNC: NORMAL
RBC # BLD: 4.78 M/UL
RBC # FLD: 13.7 %
SODIUM SERPL-SCNC: 139 MMOL/L
SP GR UR STRIP: 1.01
T3FREE SERPL-MCNC: 3.44 PG/ML
T4 FREE SERPL-MCNC: 1.2 NG/DL
TRIGL SERPL-MCNC: 99 MG/DL
TSH SERPL-ACNC: 1.37 UIU/ML
WBC # FLD AUTO: 4.94 K/UL

## 2023-03-29 ENCOUNTER — APPOINTMENT (OUTPATIENT)
Dept: ULTRASOUND IMAGING | Facility: CLINIC | Age: 53
End: 2023-03-29
Payer: COMMERCIAL

## 2023-03-29 ENCOUNTER — APPOINTMENT (OUTPATIENT)
Dept: MAMMOGRAPHY | Facility: CLINIC | Age: 53
End: 2023-03-29
Payer: COMMERCIAL

## 2023-03-29 ENCOUNTER — OUTPATIENT (OUTPATIENT)
Dept: OUTPATIENT SERVICES | Facility: HOSPITAL | Age: 53
LOS: 1 days | End: 2023-03-29
Payer: COMMERCIAL

## 2023-03-29 DIAGNOSIS — N76.0 ACUTE VAGINITIS: ICD-10-CM

## 2023-03-29 DIAGNOSIS — R76.0 RAISED ANTIBODY TITER: ICD-10-CM

## 2023-03-29 PROCEDURE — 76856 US EXAM PELVIC COMPLETE: CPT | Mod: 26

## 2023-03-29 PROCEDURE — 77063 BREAST TOMOSYNTHESIS BI: CPT | Mod: 26

## 2023-03-29 PROCEDURE — 77067 SCR MAMMO BI INCL CAD: CPT | Mod: 26

## 2023-03-29 PROCEDURE — 76830 TRANSVAGINAL US NON-OB: CPT | Mod: 26

## 2023-03-29 PROCEDURE — 77067 SCR MAMMO BI INCL CAD: CPT

## 2023-03-29 PROCEDURE — 76856 US EXAM PELVIC COMPLETE: CPT

## 2023-03-29 PROCEDURE — 77063 BREAST TOMOSYNTHESIS BI: CPT

## 2023-03-29 PROCEDURE — 76830 TRANSVAGINAL US NON-OB: CPT

## 2023-05-01 ENCOUNTER — RX RENEWAL (OUTPATIENT)
Age: 53
End: 2023-05-01

## 2023-05-03 ENCOUNTER — NON-APPOINTMENT (OUTPATIENT)
Age: 53
End: 2023-05-03

## 2023-05-03 ENCOUNTER — RX RENEWAL (OUTPATIENT)
Age: 53
End: 2023-05-03

## 2023-05-18 ENCOUNTER — APPOINTMENT (OUTPATIENT)
Dept: FAMILY MEDICINE | Facility: CLINIC | Age: 53
End: 2023-05-18
Payer: COMMERCIAL

## 2023-05-18 VITALS
HEART RATE: 87 BPM | OXYGEN SATURATION: 97 % | TEMPERATURE: 98.8 F | WEIGHT: 137 LBS | HEIGHT: 58 IN | SYSTOLIC BLOOD PRESSURE: 102 MMHG | DIASTOLIC BLOOD PRESSURE: 60 MMHG | BODY MASS INDEX: 28.76 KG/M2

## 2023-05-18 DIAGNOSIS — M54.16 RADICULOPATHY, LUMBAR REGION: ICD-10-CM

## 2023-05-18 DIAGNOSIS — F51.01 PRIMARY INSOMNIA: ICD-10-CM

## 2023-05-18 PROCEDURE — 99214 OFFICE O/P EST MOD 30 MIN: CPT

## 2023-05-18 NOTE — HISTORY OF PRESENT ILLNESS
[Hypertension] : Hypertension [FreeTextEntry6] : Pt is here for BP check. [Does not check BP] : The patient is not checking blood pressure [<140/90] : Target blood pressure is <140/90 [Target goal met] : BP target goal met

## 2023-06-13 ENCOUNTER — APPOINTMENT (OUTPATIENT)
Dept: GASTROENTEROLOGY | Facility: CLINIC | Age: 53
End: 2023-06-13
Payer: COMMERCIAL

## 2023-06-13 VITALS
HEIGHT: 58 IN | HEART RATE: 70 BPM | WEIGHT: 145 LBS | DIASTOLIC BLOOD PRESSURE: 74 MMHG | SYSTOLIC BLOOD PRESSURE: 118 MMHG | BODY MASS INDEX: 30.44 KG/M2

## 2023-06-13 PROCEDURE — 99203 OFFICE O/P NEW LOW 30 MIN: CPT

## 2023-06-13 NOTE — ASSESSMENT
[FreeTextEntry1] : Plan:\par Since pt has + FMH of CRC and has not had screening colonoscopy in 10 years, would recommend. Risks versus benefits as well as instructions reviewed, pt agrees to planned procedure. All questions answered, pt expressed understanding. I have spent 30 minutes on this encounter.

## 2023-06-13 NOTE — HISTORY OF PRESENT ILLNESS
[FreeTextEntry1] : Gayle Mackey is a 52 year old female PMH HTN presents today for consult for screening colonoscopy. Notes she has + FMH of CRC in maternal aunt. Had previous colonoscopy over 10 years ago with another physician. Denies bowel complaints, typically has bowel movements regularly without significant straining or overt bleeding such as melena or hematochezia. Denies upper GI symptoms such as GERD, nausea, vomiting, or dysphagia. Denies unintentional weight loss.

## 2023-06-13 NOTE — REASON FOR VISIT
[Initial Evaluation] : an initial evaluation [[employer] ] : provided by [employer]   [Time Spent: ____ minutes] : Total time spent using  services: [unfilled] minutes. The patient's primary language is not English thus required  services. [Interpreters_IDNumber] : [id number] [Interpreters_FullName] : Kimberly [TWNoteComboBox1] : Slovak

## 2023-06-26 ENCOUNTER — RX RENEWAL (OUTPATIENT)
Age: 53
End: 2023-06-26

## 2023-07-25 ENCOUNTER — RX RENEWAL (OUTPATIENT)
Age: 53
End: 2023-07-25

## 2023-08-27 ENCOUNTER — RX RENEWAL (OUTPATIENT)
Age: 53
End: 2023-08-27

## 2023-09-07 ENCOUNTER — APPOINTMENT (OUTPATIENT)
Dept: FAMILY MEDICINE | Facility: CLINIC | Age: 53
End: 2023-09-07
Payer: COMMERCIAL

## 2023-09-07 VITALS
DIASTOLIC BLOOD PRESSURE: 78 MMHG | BODY MASS INDEX: 30.31 KG/M2 | HEART RATE: 72 BPM | TEMPERATURE: 98.8 F | WEIGHT: 145 LBS | OXYGEN SATURATION: 98 % | SYSTOLIC BLOOD PRESSURE: 120 MMHG

## 2023-09-07 PROCEDURE — 36415 COLL VENOUS BLD VENIPUNCTURE: CPT

## 2023-09-07 PROCEDURE — 99213 OFFICE O/P EST LOW 20 MIN: CPT | Mod: 25

## 2023-09-09 NOTE — HISTORY OF PRESENT ILLNESS
[FreeTextEntry8] : pt needs tb testing done because pt was exposed to daughter who has active TB Pt from [facility]. Things she had the BCG vaccine but never had a positive PPD.  has positive PPD but negative chest xray NO fever or chills No n/v/d
10-Sep-2023 00:46

## 2023-09-10 LAB
M TB IFN-G BLD-IMP: NEGATIVE
QUANTIFERON TB PLUS MITOGEN MINUS NIL: 9.2 IU/ML
QUANTIFERON TB PLUS NIL: 0.01 IU/ML
QUANTIFERON TB PLUS TB1 MINUS NIL: 0 IU/ML
QUANTIFERON TB PLUS TB2 MINUS NIL: 0 IU/ML

## 2023-09-14 ENCOUNTER — APPOINTMENT (OUTPATIENT)
Dept: FAMILY MEDICINE | Facility: CLINIC | Age: 53
End: 2023-09-14
Payer: COMMERCIAL

## 2023-09-14 VITALS
HEART RATE: 81 BPM | WEIGHT: 145 LBS | DIASTOLIC BLOOD PRESSURE: 72 MMHG | SYSTOLIC BLOOD PRESSURE: 124 MMHG | OXYGEN SATURATION: 98 % | BODY MASS INDEX: 30.31 KG/M2 | TEMPERATURE: 97.9 F

## 2023-09-14 DIAGNOSIS — Z23 ENCOUNTER FOR IMMUNIZATION: ICD-10-CM

## 2023-09-14 PROCEDURE — G0008: CPT

## 2023-09-14 PROCEDURE — 99214 OFFICE O/P EST MOD 30 MIN: CPT | Mod: 25

## 2023-09-14 PROCEDURE — 90686 IIV4 VACC NO PRSV 0.5 ML IM: CPT

## 2023-09-20 ENCOUNTER — NON-APPOINTMENT (OUTPATIENT)
Age: 53
End: 2023-09-20

## 2023-09-20 ENCOUNTER — APPOINTMENT (OUTPATIENT)
Dept: GASTROENTEROLOGY | Facility: AMBULATORY MEDICAL SERVICES | Age: 53
End: 2023-09-20
Payer: COMMERCIAL

## 2023-09-20 PROCEDURE — 45378 DIAGNOSTIC COLONOSCOPY: CPT | Mod: GC

## 2023-10-22 ENCOUNTER — RX RENEWAL (OUTPATIENT)
Age: 53
End: 2023-10-22

## 2023-12-24 ENCOUNTER — NON-APPOINTMENT (OUTPATIENT)
Age: 53
End: 2023-12-24

## 2024-01-25 ENCOUNTER — APPOINTMENT (OUTPATIENT)
Dept: FAMILY MEDICINE | Facility: CLINIC | Age: 54
End: 2024-01-25
Payer: COMMERCIAL

## 2024-01-25 ENCOUNTER — NON-APPOINTMENT (OUTPATIENT)
Age: 54
End: 2024-01-25

## 2024-01-25 VITALS
OXYGEN SATURATION: 97 % | SYSTOLIC BLOOD PRESSURE: 100 MMHG | HEIGHT: 59 IN | TEMPERATURE: 98.1 F | WEIGHT: 140 LBS | DIASTOLIC BLOOD PRESSURE: 60 MMHG | HEART RATE: 71 BPM | BODY MASS INDEX: 28.22 KG/M2

## 2024-01-25 DIAGNOSIS — Z00.00 ENCOUNTER FOR GENERAL ADULT MEDICAL EXAMINATION W/OUT ABNORMAL FINDINGS: ICD-10-CM

## 2024-01-25 PROCEDURE — 36415 COLL VENOUS BLD VENIPUNCTURE: CPT

## 2024-01-25 PROCEDURE — 99173 VISUAL ACUITY SCREEN: CPT

## 2024-01-25 PROCEDURE — 86580 TB INTRADERMAL TEST: CPT

## 2024-01-25 PROCEDURE — 93000 ELECTROCARDIOGRAM COMPLETE: CPT

## 2024-01-25 PROCEDURE — 92551 PURE TONE HEARING TEST AIR: CPT

## 2024-01-25 PROCEDURE — 99396 PREV VISIT EST AGE 40-64: CPT

## 2024-01-25 PROCEDURE — 81003 URINALYSIS AUTO W/O SCOPE: CPT | Mod: QW

## 2024-01-25 NOTE — PHYSICAL EXAM
[Normal] : affect was normal and insight and judgment were intact [FreeTextEntry1] : . Right Ear  0.5-30 1-20 2-25 4-30  Left Ear  0.5-0 1-30 2-20 4-30

## 2024-01-25 NOTE — HEALTH RISK ASSESSMENT
[Audit-CScore] : 0 [PWQ5Rdgnj] : 0 [Change in mental status noted] : No change in mental status noted [Language] : denies difficulty with language [Behavior] : denies difficulty with behavior [Learning/Retaining New Information] : denies difficulty learning/retaining new information [Handling Complex Tasks] : denies difficulty handling complex tasks [Spatial Ability and Orientation] : denies difficulty with spatial ability and orientation [Reasoning] : denies difficulty with reasoning [Reports changes in hearing] : Reports no changes in hearing [Reports changes in vision] : Reports no changes in vision [Guns at Home] : no guns at home [Reports changes in dental health] : Reports no changes in dental health [MammogramDate] : 01/22 [Caregiver Concerns] : does not have caregiver concerns [PapSmearDate] : 05/23 [ColonoscopyDate] : 09/23 [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

## 2024-01-25 NOTE — HISTORY OF PRESENT ILLNESS
[FreeTextEntry1] : Pt is here for her CPE. Also needs follow up ppd after exposure and negative testing.

## 2024-01-26 LAB
ALBUMIN SERPL ELPH-MCNC: 4.6 G/DL
ALP BLD-CCNC: 93 U/L
ALT SERPL-CCNC: 29 U/L
ANION GAP SERPL CALC-SCNC: 9 MMOL/L
AST SERPL-CCNC: 24 U/L
BASOPHILS # BLD AUTO: 0.04 K/UL
BASOPHILS NFR BLD AUTO: 0.7 %
BILIRUB SERPL-MCNC: <0.2 MG/DL
BILIRUB UR QL STRIP: NORMAL
BUN SERPL-MCNC: 11 MG/DL
CALCIUM SERPL-MCNC: 8.9 MG/DL
CHLORIDE SERPL-SCNC: 106 MMOL/L
CHOLEST SERPL-MCNC: 153 MG/DL
CLARITY UR: CLEAR
CO2 SERPL-SCNC: 26 MMOL/L
COLLECTION METHOD: NORMAL
CREAT SERPL-MCNC: 0.53 MG/DL
EGFR: 110 ML/MIN/1.73M2
EOSINOPHIL # BLD AUTO: 0.68 K/UL
EOSINOPHIL NFR BLD AUTO: 12.7 %
GLUCOSE SERPL-MCNC: 99 MG/DL
GLUCOSE UR-MCNC: NORMAL
HCG UR QL: 0.2 EU/DL
HCT VFR BLD CALC: 37.2 %
HDLC SERPL-MCNC: 53 MG/DL
HGB BLD-MCNC: 11.5 G/DL
HGB UR QL STRIP.AUTO: ABNORMAL
IMM GRANULOCYTES NFR BLD AUTO: 0.2 %
KETONES UR-MCNC: NORMAL
LDLC SERPL CALC-MCNC: 87 MG/DL
LEUKOCYTE ESTERASE UR QL STRIP: NORMAL
LYMPHOCYTES # BLD AUTO: 1.09 K/UL
LYMPHOCYTES NFR BLD AUTO: 20.4 %
MAN DIFF?: NORMAL
MCHC RBC-ENTMCNC: 26.7 PG
MCHC RBC-ENTMCNC: 30.9 GM/DL
MCV RBC AUTO: 86.5 FL
MONOCYTES # BLD AUTO: 0.43 K/UL
MONOCYTES NFR BLD AUTO: 8 %
NEUTROPHILS # BLD AUTO: 3.1 K/UL
NEUTROPHILS NFR BLD AUTO: 58 %
NITRITE UR QL STRIP: NORMAL
NONHDLC SERPL-MCNC: 101 MG/DL
PH UR STRIP: 6
PLATELET # BLD AUTO: 189 K/UL
POTASSIUM SERPL-SCNC: 3.9 MMOL/L
PROT SERPL-MCNC: 6.8 G/DL
PROT UR STRIP-MCNC: NORMAL
RBC # BLD: 4.3 M/UL
RBC # FLD: 13.7 %
SODIUM SERPL-SCNC: 141 MMOL/L
SP GR UR STRIP: 1.02
T3FREE SERPL-MCNC: 3.51 PG/ML
T4 FREE SERPL-MCNC: 1.2 NG/DL
TRIGL SERPL-MCNC: 70 MG/DL
TSH SERPL-ACNC: 1.15 UIU/ML
WBC # FLD AUTO: 5.35 K/UL

## 2024-01-29 ENCOUNTER — RX CHANGE (OUTPATIENT)
Age: 54
End: 2024-01-29

## 2024-01-29 RX ORDER — AMLODIPINE BESYLATE 5 MG/1
5 TABLET ORAL
Qty: 30 | Refills: 2 | Status: DISCONTINUED | COMMUNITY
Start: 2021-09-30 | End: 2024-01-29

## 2024-04-02 ENCOUNTER — APPOINTMENT (OUTPATIENT)
Dept: MAMMOGRAPHY | Facility: CLINIC | Age: 54
End: 2024-04-02

## 2024-04-14 ENCOUNTER — NON-APPOINTMENT (OUTPATIENT)
Age: 54
End: 2024-04-14

## 2024-04-15 ENCOUNTER — EMERGENCY (EMERGENCY)
Facility: HOSPITAL | Age: 54
LOS: 0 days | Discharge: HOME | End: 2024-04-16
Attending: HOSPITALIST
Payer: COMMERCIAL

## 2024-04-15 VITALS — WEIGHT: 179.9 LBS | HEIGHT: 68 IN
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
## 2024-04-15 DIAGNOSIS — S61.012A LACERATION WITHOUT FOREIGN BODY OF LEFT THUMB WITHOUT DAMAGE TO NAIL, INITIAL ENCOUNTER: ICD-10-CM
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
## 2024-04-15 PROCEDURE — 99285 EMERGENCY DEPT VISIT HI MDM: CPT

## 2024-04-15 PROCEDURE — 99053 MED SERV 10PM-8AM 24 HR FAC: CPT

## 2024-04-15 PROCEDURE — 12001 RPR S/N/AX/GEN/TRNK 2.5CM/<: CPT

## 2024-04-15 PROCEDURE — 99284 EMERGENCY DEPT VISIT MOD MDM: CPT | Mod: 25

## 2024-04-15 PROCEDURE — 73120 X-RAY EXAM OF HAND: CPT | Mod: LT

## 2024-04-15 NOTE — ED ADULT TRIAGE NOTE - CHIEF COMPLAINT QUOTE
laceration to right hand with knife.  seen at urgent care. patient cannot feel her thumb. sent to ED.

## 2024-04-16 VITALS
DIASTOLIC BLOOD PRESSURE: 77 MMHG | HEART RATE: 62 BPM | OXYGEN SATURATION: 98 % | SYSTOLIC BLOOD PRESSURE: 146 MMHG | RESPIRATION RATE: 18 BRPM | TEMPERATURE: 97 F

## 2024-04-16 PROCEDURE — 73120 X-RAY EXAM OF HAND: CPT | Mod: 26,LT

## 2024-04-16 RX ORDER — TETANUS TOXOID, REDUCED DIPHTHERIA TOXOID AND ACELLULAR PERTUSSIS VACCINE, ADSORBED 5; 2.5; 8; 8; 2.5 [IU]/.5ML; [IU]/.5ML; UG/.5ML; UG/.5ML; UG/.5ML
0.5 SUSPENSION INTRAMUSCULAR ONCE
Refills: 0 | Status: COMPLETED | OUTPATIENT
Start: 2024-04-16 | End: 2024-04-16

## 2024-04-16 RX ORDER — CEPHALEXIN 500 MG
1 CAPSULE ORAL
Qty: 21 | Refills: 0
Start: 2024-04-16 | End: 2024-04-22

## 2024-04-16 RX ORDER — CEPHALEXIN 500 MG
500 CAPSULE ORAL ONCE
Refills: 0 | Status: COMPLETED | OUTPATIENT
Start: 2024-04-16 | End: 2024-04-16

## 2024-04-16 RX ADMIN — Medication 500 MILLIGRAM(S): at 03:15

## 2024-04-16 NOTE — ED PROVIDER NOTE - NSFOLLOWUPINSTRUCTIONS_ED_ALL_ED_FT
Please follow-up with hand surgery tomorrow.  Call to make an appointment.  Please take your antibiotics as prescribed.  Take tylenol as needed for pain, 650Mg every 6-8 hours.  You can also take ibuprofen as needed for pain, 600mg every 6-8 hours, take with food.    Please keep the splint clean and dry

## 2024-04-16 NOTE — CONSULT NOTE ADULT - SUBJECTIVE AND OBJECTIVE BOX
53y Female RHD with c/o L L palm stab injury. Patient was getting coconut meat out of a coconut when her hand slipped and knife went into her left palm. Patient noted bleeding and loss of sensation to palmar aspect of thumb past the MCP joint. Denies falls. Denies HS/LOC. Denies numbness/tingling in other areas. No other pain/injuries. Denies fevers/chills. Patient up to date on tetanus from ED      HEALTH ISSUES - PROBLEM Dx:        MEDICATIONS  (STANDING):    Allergies    No Known Allergies    Intolerances        Physical Exam  Gen: NAD  LUE: Warm/well perfused, at thumb, 1cm laceration noted to palmar aspect of the thenar eminence. No sensation present in palmar aspect of thumb distal to mcp joint. Otherwise Patient sensation intact to light touch in the rest of the hand. With intact flexion at IP joint and palmar abduction of thumb. Otherwisegrossly  intact flexion/extension at PIP/DIP joints of other fingers in left hand      Procedure:   L hand was prepped and draped in sterile fashion. 1% lidocaine was used for regional anesthesia of the injured area. The laceration was copiously irrigated, and approximated using 3-0 chromic sutures. The laceration was dressed with dry dressings. Patient placed in thumb spica orthoglass splint. The patient tolerated the procedure well. NVI post-procedure.     Imaging  XR Hand: No acute fracture or dislocation            Vital Signs Last 24 Hrs  T(C): 36.7 (04-16-24 @ 00:21), Max: 36.7 (04-16-24 @ 00:21)  T(F): 98.1 (04-16-24 @ 00:21), Max: 98.1 (04-16-24 @ 00:21)  HR: 70 (04-16-24 @ 00:21) (70 - 70)  BP: 144/69 (04-16-24 @ 00:21) (144/69 - 144/69)  BP(mean): --  RR: 18 (04-16-24 @ 00:21) (18 - 18)  SpO2: 100% (04-16-24 @ 00:21) (100% - 100%)    A/P: 53y Female withL palm stab injury with L median nerve digit branch to thumb laceration  Pain control  Ice/elevate as needed  Keep dressing clean and dry  Pt to be sent home with antibiotic prophylaxis (Keflex)  All question answered  Follow up with Dr. Brasher as outpatient as soon as later today. Call office for appointment. Explained that she would likely require surgery for optimal outcomes of sensation return to thumb. Patient voiced understanding  Ortho stable for discharge

## 2024-04-16 NOTE — ED ADULT NURSE NOTE - NSFALLUNIVINTERV_ED_ALL_ED
Bed/Stretcher in lowest position, wheels locked, appropriate side rails in place/Call bell, personal items and telephone in reach/Instruct patient to call for assistance before getting out of bed/chair/stretcher/Non-slip footwear applied when patient is off stretcher/[place] to call system/Physically safe environment - no spills, clutter or unnecessary equipment/Purposeful proactive rounding/Room/bathroom lighting operational, light cord in reach

## 2024-04-16 NOTE — ED PROVIDER NOTE - OBJECTIVE STATEMENT
53-year-old female with no past medical history presents after accidentally stabbing herself in the left hand with a knife while cutting food in the kitchen.  Patient is right-handed at baseline.  She reports that she does not have any sensation in her left thumb.  Washed her hand thoroughly after incident.  Last tetanus shot unknown.

## 2024-04-16 NOTE — ED ADULT NURSE NOTE - OBJECTIVE STATEMENT
52yo Female co laceration to Right palm with knife. As per pt, knife stabbed into hand but did not slice down. Pt endorsing numbness/cannot feel Right first digit and tingling in R 2nd digit, bleeding controlled. Pt went to  and sent to ED. AOx4, no other complaints at this time.

## 2024-04-16 NOTE — ED PROVIDER NOTE - PHYSICAL EXAMINATION
Constitutional: NAD AAOx3  Eyes: PERRLA EOMI  Head: Normocephalic atraumatic  Mouth: MMM  Cardiac: regular rate   Resp: Lungs CTAB  GI: Abd s/nt/nd  Neuro: CN2-12 intact, Decreased sensation over left.  Skin: No visible rashes.   2 cm linear laceration through palm of left hand

## 2024-04-16 NOTE — ED PROVIDER NOTE - CLINICAL SUMMARY MEDICAL DECISION MAKING FREE TEXT BOX
53-year-old female with accidental laceration of her palm with decreased sensation of her left thumb.  will obtain x-rays and give Tdap now along with pain control. Will consult hand for evaluation.

## 2024-04-16 NOTE — ED PROVIDER NOTE - CARE PROVIDER_API CALL
Tristian Brasher.  Orthopaedic Surgery  [address] [place] [address]  Phone: [phone]  Fax: [phone]  Follow Up Time: 1-3 Days

## 2024-04-16 NOTE — ED PROVIDER NOTE - PATIENT PORTAL LINK FT
You can access the FollowMyHealth Patient Portal offered by [facility] by registering at the following website: [email or web address]. By joining [facility]’s FollowMyHealth portal, you will also be able to view your health information using other applications (apps) compatible with our system.

## 2024-04-16 NOTE — ED ADULT NURSE NOTE - ISOLATION TYPE:
Spoke with heme-onc fellow Disha Lanier with recommended platelet transfusion. Plan to hold oral chemotherapy medication. Will follow up with heme-onc via phone later today.   Sarah Prasad MD PGY-4 Isiah Wolfe, PGY3 This patient was signed out to me.   I spoke with heme-onc and they would like to see patient in the office tomorrow around 2:30 PM.  They are requesting an ultrasound of the pancreas here and they will follow-up on the results of this.  Patient received his platelets and is well-appearing at bedside, taking p.o. <late entry>  At the end of my shift, I signed out to my colleague Dr. Saldana.  Plan to complete plt transfusion, and follow up with oncology for further care plan and dispo plan.  Please note that the note may include information regarding the ED course after the time of attending sign out.  Jaime Hart MD None Platlets downtrended on repeat labs.  Spoke with heme-onc fellow Disha Lanier with recommended platelet transfusion. Plan to hold oral chemotherapy medication. Will follow up with heme-onc via phone later today.   Sarah Prasad MD PGY-4

## 2024-04-16 NOTE — ED PROVIDER NOTE - NS ED MD DISPO DISCHARGE CCDA
Received notification from bedside RN about patient with regards to: hypoglycemia with poor response to PO glucose( BG 62, dropped to 56 after 4 glucose tabs, giving D50 IV now)  VS: /90, , RR 20, O2 sat 96% on RA    Intervention given: start back on D10 @ 30 ml/hour    0209: BG 52, diaphoretic  VS: Temp 96.3, /106, HR 97, RR 22, O2 sat 94% on RA    - D50 IV 1 amp now, increase D10 to 50 ml/hour, Rin crespo ordered    0414: Assessed patient at bedside, noted increased WOB and tachypnea    pro-BNP:  CXR:   ABG (pH 7.36, pCO2 19, pO2 88, HCO3 10, O2 sat 97)    0602: BG 36, D50 IV being administered  VS: /91, HR 62, RR 48, O2 sat 97% on NC 4 L    - Transfer to stepdown    0645: Noted K+ 6.2, instruct receiving RN to relay to Nephrology as patient is also having increasing WOB.  Might need to be dialyzed today Patient/Caregiver provided printed discharge information.

## 2024-04-29 ENCOUNTER — APPOINTMENT (OUTPATIENT)
Dept: ORTHOPEDIC SURGERY | Facility: CLINIC | Age: 54
End: 2024-04-29
Payer: COMMERCIAL

## 2024-04-29 VITALS — BODY MASS INDEX: 30.44 KG/M2 | HEIGHT: 58 IN | WEIGHT: 145 LBS

## 2024-04-29 PROCEDURE — 99204 OFFICE O/P NEW MOD 45 MIN: CPT

## 2024-04-29 NOTE — PHYSICAL EXAM
[de-identified] : - Constitutional: This is a female in no obvious distress.   - Psych: Patient is alert and oriented to person, place and time.  Patient has a normal mood and affect. - Cardiovascular: Normal pulses throughout the upper extremities.  No significant varicosities are noted in the upper extremities.  - Respiratory:  Patient exhibits no evidence of shortness of breath or difficulty breathing. - Skin: No rashes, lesions, or other abnormalities are noted in the upper extremities.  ---  Examination of her left hand demonstrates after the splint and dressing were removed demonstrates a small laceration in the palm, just ulnar to the thenar muscles.  It is well sutured and there is no evidence of infection.  Her flexor tendons are intact throughout the digits.  She has loss of sensation to light touch and pinprick along the radial and ulnar aspects of the thumb.  She has intact sensation to light touch and pinprick throughout the remainder of the digits at the index through little fingers.

## 2024-04-29 NOTE — DISCUSSION/SUMMARY
[FreeTextEntry1] : She has findings consistent with a left hand laceration 2 weeks ago with evidence of a probable laceration to the median nerve branches to the thumb in the palm.  I had a discussion regarding today's visit, the prognosis of this diagnosis and treatment recommendations and options.   I discussed treatment options and her son translated.  I recommended exploration of her laceration in the median nerve.  She has agreed to proceed.  She will be scheduled hopefully in 3 days later this week.  -  The nature and purposes of the operation/procedure was discussed in detail.  I discussed the surgical procedure in detail, as well as expected postoperative recovery and outcome. -  Possible risks, benefits, and complications (from known and unknown causes) of the procedure were discussed in detail.   -  Possible non-operative alternatives to the proposed treatment were discussed in detail.   -She and her son were told that possible risks/complications include, but are not limited to:  Infection, nerve or vessel injury, stiffness, painful scar, poor outcome, need for additional surgical procedures, and other unforeseen complications.   -  In addition, the possibility of an "unsuccessful outcome," despite "successful surgery," was discussed with her.  I discussed with her that nerve recovery after laceration can be unpredictable and there are no guarantees that her sensation will normalize.  She also understands that she will not regain sensation immediately after the repair and this can sometimes take upwards of 3 to 6 months.  Understanding this, she has agreed to proceed -  The patient fully understands these risks and wishes to proceed.   -  I had a lengthy discussion with the patient regarding today's visit, the diagnosis, and my surgical treatment recommendations.  The patient has agreed to this plan of management and has expressed full understanding.  All questions were fully answered to the patient's satisfaction.  The patient has agreed to this plan of management and has expressed full understanding.  All questions were fully answered to the patient's satisfaction.  My cumulative time spent on this patient's visit included: Preparation for the visit, review of the medical records, review of pertinent diagnostic studies, examination and counseling of the patient on the above diagnosis, treatment plan and prognosis, orders of diagnostic tests, medications and/or appropriate procedures and documentation in the medical records of today's visit.

## 2024-04-29 NOTE — HISTORY OF PRESENT ILLNESS
[Right] : right hand dominant [FreeTextEntry1] : She comes in today for evaluation of a left hand laceration 2 weeks ago.  She was seen at [facility] and her laceration was sutured.  She cut it while cutting a coconut and stabbed her hand.  She has complaints of numbness to her thumb.  She was accompanied by her daughter-in-law today.  She speaks Cymraes and her son translated on the telephone.

## 2024-04-30 ENCOUNTER — OUTPATIENT (OUTPATIENT)
Dept: OUTPATIENT SERVICES | Facility: HOSPITAL | Age: 54
LOS: 1 days | End: 2024-04-30
Payer: COMMERCIAL

## 2024-04-30 VITALS
DIASTOLIC BLOOD PRESSURE: 65 MMHG | HEIGHT: 59 IN | OXYGEN SATURATION: 99 % | SYSTOLIC BLOOD PRESSURE: 117 MMHG | WEIGHT: 139.99 LBS | RESPIRATION RATE: 13 BRPM | HEART RATE: 66 BPM | TEMPERATURE: 98 F

## 2024-04-30 DIAGNOSIS — Z01.818 ENCOUNTER FOR OTHER PREPROCEDURAL EXAMINATION: ICD-10-CM

## 2024-04-30 DIAGNOSIS — S54.12XA INJURY OF MEDIAN NERVE AT FOREARM LEVEL, LEFT ARM, INITIAL ENCOUNTER: ICD-10-CM

## 2024-04-30 DIAGNOSIS — S61.412A LACERATION WITHOUT FOREIGN BODY OF LEFT HAND, INITIAL ENCOUNTER: ICD-10-CM

## 2024-04-30 DIAGNOSIS — D25.9 LEIOMYOMA OF UTERUS, UNSPECIFIED: Chronic | ICD-10-CM

## 2024-04-30 LAB
ALBUMIN SERPL ELPH-MCNC: 3.9 G/DL — SIGNIFICANT CHANGE UP (ref 3.3–5)
ALP SERPL-CCNC: 101 U/L — SIGNIFICANT CHANGE UP (ref 30–120)
ALT FLD-CCNC: 22 U/L — SIGNIFICANT CHANGE UP (ref 10–60)
ANION GAP SERPL CALC-SCNC: 4 MMOL/L — LOW (ref 5–17)
AST SERPL-CCNC: 14 U/L — SIGNIFICANT CHANGE UP (ref 10–40)
BILIRUB SERPL-MCNC: 0.2 MG/DL — SIGNIFICANT CHANGE UP (ref 0.2–1.2)
BUN SERPL-MCNC: 12 MG/DL — SIGNIFICANT CHANGE UP (ref 7–23)
CALCIUM SERPL-MCNC: 8.7 MG/DL — SIGNIFICANT CHANGE UP (ref 8.4–10.5)
CHLORIDE SERPL-SCNC: 108 MMOL/L — SIGNIFICANT CHANGE UP (ref 96–108)
CO2 SERPL-SCNC: 31 MMOL/L — SIGNIFICANT CHANGE UP (ref 22–31)
CREAT SERPL-MCNC: 0.53 MG/DL — SIGNIFICANT CHANGE UP (ref 0.5–1.3)
EGFR: 110 ML/MIN/1.73M2 — SIGNIFICANT CHANGE UP
GLUCOSE SERPL-MCNC: 136 MG/DL — HIGH (ref 70–99)
HCT VFR BLD CALC: 39.4 % — SIGNIFICANT CHANGE UP (ref 34.5–45)
HGB BLD-MCNC: 12.2 G/DL — SIGNIFICANT CHANGE UP (ref 11.5–15.5)
MCHC RBC-ENTMCNC: 26.5 PG — LOW (ref 27–34)
MCHC RBC-ENTMCNC: 31 GM/DL — LOW (ref 32–36)
MCV RBC AUTO: 85.5 FL — SIGNIFICANT CHANGE UP (ref 80–100)
NRBC # BLD: 0 /100 WBCS — SIGNIFICANT CHANGE UP (ref 0–0)
PLATELET # BLD AUTO: 200 K/UL — SIGNIFICANT CHANGE UP (ref 150–400)
POTASSIUM SERPL-MCNC: 3.2 MMOL/L — LOW (ref 3.5–5.3)
POTASSIUM SERPL-SCNC: 3.2 MMOL/L — LOW (ref 3.5–5.3)
PROT SERPL-MCNC: 7.2 G/DL — SIGNIFICANT CHANGE UP (ref 6–8.3)
RBC # BLD: 4.61 M/UL — SIGNIFICANT CHANGE UP (ref 3.8–5.2)
RBC # FLD: 13.6 % — SIGNIFICANT CHANGE UP (ref 10.3–14.5)
SODIUM SERPL-SCNC: 143 MMOL/L — SIGNIFICANT CHANGE UP (ref 135–145)
WBC # BLD: 6.03 K/UL — SIGNIFICANT CHANGE UP (ref 3.8–10.5)
WBC # FLD AUTO: 6.03 K/UL — SIGNIFICANT CHANGE UP (ref 3.8–10.5)

## 2024-04-30 PROCEDURE — 85027 COMPLETE CBC AUTOMATED: CPT

## 2024-04-30 PROCEDURE — 36415 COLL VENOUS BLD VENIPUNCTURE: CPT

## 2024-04-30 PROCEDURE — G0463: CPT

## 2024-04-30 PROCEDURE — 80053 COMPREHEN METABOLIC PANEL: CPT

## 2024-04-30 NOTE — H&P PST ADULT - GENITOURINARY
Alexandro Caraballo went ahead and evaluated our patient Jackie Burko and she has been medically optimized for her procedure and can proceed.     Ziggy Georges negative

## 2024-04-30 NOTE — H&P PST ADULT - NSICDXFAMILYHX_GEN_ALL_CORE_FT
FAMILY HISTORY:  Father  Still living? Yes, Estimated age: 71-80  FH: HTN (hypertension), Age at diagnosis: Age Unknown    Mother  Still living? No  FH: lung disease, Age at diagnosis: Age Unknown

## 2024-04-30 NOTE — H&P PST ADULT - HISTORY OF PRESENT ILLNESS
this is  a53 y/o female who cut her hand while cutting a cocoanut on 4/15/24, to have repair of medican nerve

## 2024-05-01 ENCOUNTER — APPOINTMENT (OUTPATIENT)
Dept: FAMILY MEDICINE | Facility: CLINIC | Age: 54
End: 2024-05-01
Payer: COMMERCIAL

## 2024-05-01 ENCOUNTER — TRANSCRIPTION ENCOUNTER (OUTPATIENT)
Age: 54
End: 2024-05-01

## 2024-05-01 ENCOUNTER — NON-APPOINTMENT (OUTPATIENT)
Age: 54
End: 2024-05-01

## 2024-05-01 VITALS
SYSTOLIC BLOOD PRESSURE: 118 MMHG | OXYGEN SATURATION: 98 % | HEART RATE: 68 BPM | WEIGHT: 150 LBS | TEMPERATURE: 98.5 F | HEIGHT: 58 IN | DIASTOLIC BLOOD PRESSURE: 70 MMHG | BODY MASS INDEX: 31.49 KG/M2

## 2024-05-01 DIAGNOSIS — Z12.11 ENCOUNTER FOR SCREENING FOR MALIGNANT NEOPLASM OF COLON: ICD-10-CM

## 2024-05-01 DIAGNOSIS — M25.562 PAIN IN LEFT KNEE: ICD-10-CM

## 2024-05-01 DIAGNOSIS — Z20.9 CONTACT WITH AND (SUSPECTED) EXPOSURE TO UNSPECIFIED COMMUNICABLE DISEASE: ICD-10-CM

## 2024-05-01 PROCEDURE — 99215 OFFICE O/P EST HI 40 MIN: CPT

## 2024-05-01 RX ORDER — AMLODIPINE BESYLATE 5 MG/1
5 TABLET ORAL
Qty: 90 | Refills: 1 | Status: ACTIVE | COMMUNITY
Start: 1900-01-01 | End: 1900-01-01

## 2024-05-01 NOTE — HISTORY OF PRESENT ILLNESS
[No Pertinent Cardiac History] : no history of aortic stenosis, atrial fibrillation, coronary artery disease, recent myocardial infarction, or implantable device/pacemaker [No Pertinent Pulmonary History] : no history of asthma, COPD, sleep apnea, or smoking [No Adverse Anesthesia Reaction] : no adverse anesthesia reaction in self or family member [(Patient denies any chest pain, claudication, dyspnea on exertion, orthopnea, palpitations or syncope)] : Patient denies any chest pain, claudication, dyspnea on exertion, orthopnea, palpitations or syncope [Moderate (4-6 METs)] : Moderate (4-6 METs) [Anti-Platelet Agents: _____] : Anti-Platelet Agents: [unfilled] [NSAIDs: _____] : NSAIDs: [unfilled] [Herbal Supplements: _____] : Herbal Supplements: [unfilled] [Chronic Anticoagulation] : no chronic anticoagulation [Chronic Kidney Disease] : no chronic kidney disease [Diabetes] : no diabetes [FreeTextEntry1] : Left Thumb/ Hand Surgery [FreeTextEntry2] : 05/02/2024 [FreeTextEntry3] : Fernando Cortez MD [FreeTextEntry4] : pt is here for medical clearance

## 2024-05-01 NOTE — PLAN
[FreeTextEntry1] : The patient has been advised to remain NPO after the midright prior to surgery. Risks and benefits of the surgery have been discussed by the operative physician. The patient has been advised to avoid taking aspirin and aspirin containing products from now until surgery. The patient has been advised to take the  amlodipine  on the morning of surgery with small sip of water. Advised to eat 2 bananas today Thank you for including me in the care of your patient.

## 2024-05-01 NOTE — CONSULT LETTER
[Dear  ___] : Dear  [unfilled], [( Thank you for referring [unfilled] for consultation for _____ )] : Thank you for referring [unfilled] for consultation for [unfilled] [Please see my note below.] : Please see my note below. [Consult Closing:] : Thank you very much for allowing me to participate in the care of this patient.  If you have any questions, please do not hesitate to contact me. [Sincerely,] : Sincerely, [FreeTextEntry3] : Melvin Miranda MD, FAAFP Chair, Family Medicine, [facility] , Family Medicine, [employer], [facility] , Family Medicine, [employer] , Family and Community Medicine, [facility]

## 2024-05-01 NOTE — RESULTS/DATA
[] : results reviewed [de-identified] : WNL [de-identified] : WNL except for K+ 3.2 [de-identified] : NSR without acute changes

## 2024-05-02 ENCOUNTER — TRANSCRIPTION ENCOUNTER (OUTPATIENT)
Age: 54
End: 2024-05-02

## 2024-05-02 ENCOUNTER — OUTPATIENT (OUTPATIENT)
Dept: OUTPATIENT SERVICES | Facility: HOSPITAL | Age: 54
LOS: 1 days | End: 2024-05-02
Payer: COMMERCIAL

## 2024-05-02 ENCOUNTER — APPOINTMENT (OUTPATIENT)
Dept: ORTHOPEDIC SURGERY | Facility: HOSPITAL | Age: 54
End: 2024-05-02

## 2024-05-02 VITALS
RESPIRATION RATE: 15 BRPM | HEART RATE: 88 BPM | DIASTOLIC BLOOD PRESSURE: 64 MMHG | SYSTOLIC BLOOD PRESSURE: 112 MMHG | OXYGEN SATURATION: 99 %

## 2024-05-02 VITALS
HEART RATE: 63 BPM | WEIGHT: 139.99 LBS | OXYGEN SATURATION: 99 % | TEMPERATURE: 97 F | SYSTOLIC BLOOD PRESSURE: 124 MMHG | RESPIRATION RATE: 18 BRPM | HEIGHT: 59 IN | DIASTOLIC BLOOD PRESSURE: 67 MMHG

## 2024-05-02 DIAGNOSIS — D25.9 LEIOMYOMA OF UTERUS, UNSPECIFIED: Chronic | ICD-10-CM

## 2024-05-02 DIAGNOSIS — S54.12XA INJURY OF MEDIAN NERVE AT FOREARM LEVEL, LEFT ARM, INITIAL ENCOUNTER: ICD-10-CM

## 2024-05-02 PROCEDURE — 64831 REPAIR OF DIGIT NERVE: CPT | Mod: F1

## 2024-05-02 PROCEDURE — 64835 REPAIR OF HAND OR FOOT NERVE: CPT | Mod: LT

## 2024-05-02 PROCEDURE — 64832 REPAIR NERVE ADD-ON: CPT | Mod: FA

## 2024-05-02 RX ORDER — CHLORHEXIDINE GLUCONATE 213 G/1000ML
1 SOLUTION TOPICAL ONCE
Refills: 0 | Status: COMPLETED | OUTPATIENT
Start: 2024-05-02 | End: 2024-05-02

## 2024-05-02 RX ORDER — ONDANSETRON 8 MG/1
4 TABLET, FILM COATED ORAL ONCE
Refills: 0 | Status: COMPLETED | OUTPATIENT
Start: 2024-05-02 | End: 2024-05-02

## 2024-05-02 RX ORDER — SODIUM CHLORIDE 9 MG/ML
1000 INJECTION, SOLUTION INTRAVENOUS
Refills: 0 | Status: DISCONTINUED | OUTPATIENT
Start: 2024-05-02 | End: 2024-05-02

## 2024-05-02 RX ORDER — HYDROMORPHONE HYDROCHLORIDE 2 MG/ML
0.5 INJECTION INTRAMUSCULAR; INTRAVENOUS; SUBCUTANEOUS
Refills: 0 | Status: DISCONTINUED | OUTPATIENT
Start: 2024-05-02 | End: 2024-05-02

## 2024-05-02 RX ORDER — APREPITANT 80 MG/1
40 CAPSULE ORAL ONCE
Refills: 0 | Status: COMPLETED | OUTPATIENT
Start: 2024-05-02 | End: 2024-05-02

## 2024-05-02 RX ORDER — IBUPROFEN 200 MG
1 TABLET ORAL
Qty: 15 | Refills: 0
Start: 2024-05-02

## 2024-05-02 RX ORDER — OXYCODONE HYDROCHLORIDE 5 MG/1
5 TABLET ORAL ONCE
Refills: 0 | Status: DISCONTINUED | OUTPATIENT
Start: 2024-05-02 | End: 2024-05-02

## 2024-05-02 RX ORDER — HYDROMORPHONE HYDROCHLORIDE 2 MG/ML
1 INJECTION INTRAMUSCULAR; INTRAVENOUS; SUBCUTANEOUS
Refills: 0 | Status: DISCONTINUED | OUTPATIENT
Start: 2024-05-02 | End: 2024-05-02

## 2024-05-02 RX ORDER — CEFAZOLIN SODIUM 1 G
2000 VIAL (EA) INJECTION ONCE
Refills: 0 | Status: COMPLETED | OUTPATIENT
Start: 2024-05-02 | End: 2024-05-02

## 2024-05-02 RX ORDER — OXYCODONE AND ACETAMINOPHEN 5; 325 MG/1; MG/1
1 TABLET ORAL
Qty: 5 | Refills: 0
Start: 2024-05-02

## 2024-05-02 RX ADMIN — ONDANSETRON 4 MILLIGRAM(S): 8 TABLET, FILM COATED ORAL at 12:13

## 2024-05-02 RX ADMIN — CHLORHEXIDINE GLUCONATE 1 APPLICATION(S): 213 SOLUTION TOPICAL at 08:47

## 2024-05-02 RX ADMIN — HYDROMORPHONE HYDROCHLORIDE 0.5 MILLIGRAM(S): 2 INJECTION INTRAMUSCULAR; INTRAVENOUS; SUBCUTANEOUS at 12:13

## 2024-05-02 RX ADMIN — SODIUM CHLORIDE 100 MILLILITER(S): 9 INJECTION, SOLUTION INTRAVENOUS at 12:13

## 2024-05-02 RX ADMIN — HYDROMORPHONE HYDROCHLORIDE 0.5 MILLIGRAM(S): 2 INJECTION INTRAMUSCULAR; INTRAVENOUS; SUBCUTANEOUS at 12:30

## 2024-05-02 RX ADMIN — APREPITANT 40 MILLIGRAM(S): 80 CAPSULE ORAL at 08:48

## 2024-05-02 NOTE — ASU DISCHARGE PLAN (ADULT/PEDIATRIC) - FOLLOW UP APPOINTMENTS
After Visit Summary   10/18/2018    Quang Roberts    MRN: [id number]           Patient Information     Date Of Birth          2015        Visit Information        Provider Department      10/18/2018 2:20 PM Elvira Rushing MD [facility]        Today's Diagnoses     Encounter for routine child health examination w/o abnormal findings    -  1    Need for prophylactic vaccination and inoculation against influenza          Care Instructions    I recommend purchasing aquaphor and apply this twice daily to their cheeks.     Keratosis Pilaris    Keratosis pilaris is a very common benign skin condition appearing as small, whitish bumps on the upper arms and thighs, especially of children and young adults. Individual lesions of keratosis pilaris arise when a hair follicle becomes plugged with keratin, a protein found in skin, hair, and nails.     Who's at risk  Keratosis pilaris can affect people of any age, any race, and either sex. It is more common in females.    Keratosis pilaris often develops by age 10 and can worsen during puberty. However, it frequently improves or even goes away by early adulthood.    Keratosis pilaris can affect 50-80% of teenagers and up to 40% of adults. Many people have a family history of keratosis pilaris. A large number of individuals with ichthyosis vulgaris (an inherited skin condition characterized by very dry, very scaly skin) also report the presence of keratosis pilaris.     Signs and symptoms  The most common locations for keratosis pilaris include:  Backs of the upper arms   Fronts of the thighs   Buttocks   Cheeks, especially in children  Tiny (1-2 mm) white to gray bumps occur, centered in the hair follicle. Sometimes a thin, red ring may surround the white bump, indicating inflammation. The bumps all look very similar to one another, and they are evenly spaced on the skin surface.    Rarely, people with keratosis pilaris may complain of mild  "itching.    Keratosis pilaris tends to improve in warmer, more humid weather, and it may worsen in colder, drier weather.     Caring for keratosis pilaris at home  There is no cure for keratosis pilaris, though its appearance can be improved. It is often helpful to keep the skin moist (hydrated) and to use mild, fragrance-free cleansers with daily applications of moisturizer.    Creams and ointments are better moisturizers than lotions, and they work best when applied just after bathing, while the skin is still moist. The following over-the-counter products may be helpful:  Preparations containing alpha-hydroxy acids such as glycolic acid or lactic acid  (amlactin, Lac-hydrin, or Eucerin Intensive Repair Therapy)   Creams containing urea   Over-the-counter cortisone cream (if the areas are itchy)  Do not try to scrub the bumps away with a pumice stone or similar harsh exfoliant; these approaches may irritate the skin and worsen the condition. Similarly, try to avoid scratching or picking at the bumps, as these actions can lead to bacterial infections or scarring.         Preventive Care at the 3 Year Visit  Growth Measurements & Percentiles  Weight: 34 lbs 0 oz / 15.4 kg (actual weight) / 35 %ile based on CDC 2-20 Years weight-for-age data using vitals from 10/18/2018.   Length: 3' 3.75\" / 101 cm 42 %ile based on CDC 2-20 Years stature-for-age data using vitals from 10/18/2018.   BMI: Body mass index is 15.13 kg/(m^2). 31 %ile based on CDC 2-20 Years BMI-for-age data using vitals from 10/18/2018.   Blood Pressure: Blood pressure percentiles are 23.8 % systolic and 32.3 % diastolic based on the August 2017 AAP Clinical Practice Guideline.    Your child s next Preventive Check-up will be at 4 years of age     Development    Your child will become more independent and begin to focus on adults and children outside of the family.    Your child should be able to:    ride a tricycle and hop     use safety scissors    show " awareness of gender identity    help get dressed and undressed    play with other children and sing    retell part of a story and count from 1 to 10    identify different colors    help with simple household chores      Read to your child for at least 15 minutes every day.  Read a lot of different stories, poetry and rhyming books.  Ask your child what he thinks will happen in the book.  Help your child use correct words and phrases.    Teach your child the meanings of new words.  Your child is growing in language use.    Your child may be eager to write and may show an interest in learning to read.  Teach your child how to print his name and play games with the alphabet.    Help your child follow directions by using short, clear sentences.    Limit the time your child watches TV, videos or plays computer games to 1 to 2 hours or less each day.  Supervise the TV shows/videos your child watches.    Encourage writing and drawing.  Help your child learn letters and numbers.    Let your child play with other children to promote sharing and cooperation.      Diet    Avoid junk foods, unhealthy snacks and soft drinks.    Encourage good eating habits.  Lead by example!  Offer a variety of foods.  Ask your child to at least try a new food.    Offer your child nutritious snacks.  Avoid foods high in sugar or fat.  Cut up raw vegetables, fruits, cheese and other foods that could cause choking hazards.    Let your child help plan and make simple meals.  he can set and clean up the table, pour cereal or make sandwiches.  Always supervise any kitchen activity.    Make mealtime a pleasant time.    Your child should drink water and low-fat milk.  Restrict pop and juice to rare occasions.    Your child needs 800 milligrams of calcium (generally 3 servings of dairy) each day.  Good sources of calcium are skim or 1 percent milk, cheese, yogurt, orange juice and soy milk with calcium added, tofu, almonds, and dark green, leafy  "vegetables.     Sleep    Your child needs between 10 to 12 hours of sleep each night.    Your child may stop taking regular naps.  If your child does not nap, you may want to start a  quiet time.   Be sure to use this time for yourself!    Safety    If your child weighs more than 40 pounds, place in a booster seat that is secured with a safety belt until he is 4 feet 9 inches (57 inches) or 8 years of age, whichever comes last.  All children ages 12 and younger should ride in the back seat of a vehicle.    Practice street safety.  Tell your child why it is important to stay out of traffic.    Have your child ride a tricycle on the sidewalk, away from the street.  Make sure he wears a helmet each time while riding.    Check outdoor playground equipment for loose parts and sharp edges. Supervise your child while at playgrounds.  Do not let your child play outside alone.    Use sunscreen with a SPF of more than 15 when your child is outside.    Teach your child water safety.  Enroll your child in swimming lessons, if appropriate.  Make sure your child is always supervised and wears a life jacket when around a lake or river.    Keep all guns out of your child s reach.  Keep guns and ammunition locked up in different parts of the house.    Keep all medicines, cleaning supplies and poisons out of your child s reach. Call the poison control center or your health care provider for directions in case your child swallows poison.    Put the poison control number on all phones:  1-324.454.8436.    Make sure your child wears a bicycle helmet any time he rides a bike.    Teach your child animal safety.    Teach your child what to do if a stranger comes up to him or her.  Warn your child never to go with a stranger or accept anything from a stranger.  Teach your child to say \"no\" if he or she is uncomfortable. Also, talk about  good touch  and  bad touch.     Teach your child his or her name, address and phone number.  Teach him " or her how to dial 9-1-1.     What Your Child Needs    Set goals and limits for your child.  Make sure the goal is realistic and something your child can easily see.  Teach your child that helping can be fun!    If you choose, you can use reward systems to learn positive behaviors or give your child time outs for discipline (1 minute for each year old).    Be clear and consistent with discipline.  Make sure your child understands what you are saying and knows what you want.  Make sure your child knows that the behavior is bad, but the child, him/herself, is not bad.  Do not use general statements like  You are a naughty girl.   Choose your battles.    Limit screen time (TV, computer, video games) to less than 2 hours per day.    Dental Care    Teach your child how to brush his teeth.  Use a soft-bristled toothbrush and a smear of fluoride toothpaste.  Parents must brush teeth first, and then have your child brush his teeth every day, preferably before bedtime.    Make regular dental appointments for cleanings and check-ups. (Your child may need fluoride supplements if you have well water.)                  Follow-ups after your visit        Follow-up notes from your care team     Return in about 1 year (around 10/18/2019) for Physical Exam.      Who to contact     If you have questions or need follow up information about today's clinic visit or your schedule please contact [facility] directly at [phone].  Normal or non-critical lab and imaging results will be communicated to you by MyChart, letter or phone within 4 business days after the clinic has received the results. If you do not hear from us within 7 days, please contact the clinic through [employer] or phone. If you have a critical or abnormal lab result, we will notify you by phone as soon as possible.  Submit refill requests through [employer] or call your pharmacy and they will forward the refill request to us. Please allow 3 business days for  "your refill to be completed.          Additional Information About Your Visit        MyChart Information     [employer] lets you send messages to your doctor, view your test results, renew your prescriptions, schedule appointments and more. To sign up, go to [email or web address], contact your [place] clinic or call [phone] during business hours.            Care EveryWhere ID     This is your Care EveryWhere ID. This could be used by other organizations to access your [place] medical records  [id number]        Your Vitals Were     Pulse Temperature Height BMI (Body Mass Index)          78 99  F (37.2  C) (Tympanic) 3' 3.75\" (1.01 m) 15.13 kg/m2         Blood Pressure from Last 3 Encounters:   10/18/18 (!) 84/45    Weight from Last 3 Encounters:   10/18/18 34 lb (15.4 kg) (35 %)*   11/17/17 30 lb (13.6 kg) (31 %)*   11/10/16 27 lb 12.8 oz (12.6 kg) (48 %)*     * Growth percentiles are based on CDC 2-20 Years data.              We Performed the Following     FLU VACCINE, SPLIT VIRUS, IM (QUADRIVALENT) [87320]- >3 YRS     Vaccine Administration, Initial [76162]        Primary Care Provider Office Phone # Fax #    Elvira Rushing [phone] [address] [facility] [facility] [address]        Equal Access to Services     MONICA ODOM AH: Hadii aad ku hadasho Sorejiali, waaxda luqadaha, qaybta kaalmada adeagnesyada, dakota valera. So [facility] [phone].    ATENCIÓN: Si habla español, tiene a ordaz disposición servicios gratuitos de asistencia lingüística. Llame al [phone].    We comply with applicable federal civil rights laws and [place] laws. We do not discriminate on the basis of race, color, national origin, age, disability, sex, sexual orientation, or gender identity.            Thank you!     Thank you for choosing [facility]  for your care. Our goal is always to provide you with excellent care. Hearing back from our patients is one way we can " continue to improve our services. Please take a few minutes to complete the written survey that you may receive in the mail after your visit with us. Thank you!             Your Updated Medication List - Protect others around you: Learn how to safely use, store and throw away your medicines at www.disposemymeds.org.          This list is accurate as of 10/18/18  2:59 PM.  Always use your most recent med list.                   Brand Name Dispense Instructions for use Diagnosis    MULTIVITAMIN GUMMIES CHILDRENS PO      Take 1 chew tab by mouth daily           522

## 2024-05-02 NOTE — ASU DISCHARGE PLAN (ADULT/PEDIATRIC) - NS MD DC FALL RISK RISK
For information on Fall & Injury Prevention, visit: [email or web address] OR  [email or web address] OR  https://www.cdc.gov/steadi/patient.html

## 2024-05-02 NOTE — BRIEF OPERATIVE NOTE - NSICDXBRIEFPROCEDURE_GEN_ALL_CORE_FT
PROCEDURES:  Carpal tunnel release with repair of median nerve 02-May-2024 11:41:52 left Karine Flor

## 2024-05-02 NOTE — ASU DISCHARGE PLAN (ADULT/PEDIATRIC) - CARE PROVIDER_API CALL
Fernando Cortez)  Surgery of the Hand  [address], [address]  [place] [address]  Phone: [phone]  Fax: [phone]  Scheduled Appointment: 05/13/2024

## 2024-05-06 ENCOUNTER — RESULT REVIEW (OUTPATIENT)
Age: 54
End: 2024-05-06

## 2024-05-06 ENCOUNTER — APPOINTMENT (OUTPATIENT)
Dept: MAMMOGRAPHY | Facility: CLINIC | Age: 54
End: 2024-05-06
Payer: COMMERCIAL

## 2024-05-06 ENCOUNTER — OUTPATIENT (OUTPATIENT)
Dept: OUTPATIENT SERVICES | Facility: HOSPITAL | Age: 54
LOS: 1 days | End: 2024-05-06
Payer: COMMERCIAL

## 2024-05-06 DIAGNOSIS — Z20.9 CONTACT WITH AND (SUSPECTED) EXPOSURE TO UNSPECIFIED COMMUNICABLE DISEASE: ICD-10-CM

## 2024-05-06 DIAGNOSIS — Z00.8 ENCOUNTER FOR OTHER GENERAL EXAMINATION: ICD-10-CM

## 2024-05-06 DIAGNOSIS — D25.9 LEIOMYOMA OF UTERUS, UNSPECIFIED: Chronic | ICD-10-CM

## 2024-05-06 PROCEDURE — 77067 SCR MAMMO BI INCL CAD: CPT | Mod: 26

## 2024-05-06 PROCEDURE — 77063 BREAST TOMOSYNTHESIS BI: CPT | Mod: 26

## 2024-05-06 PROCEDURE — 77063 BREAST TOMOSYNTHESIS BI: CPT

## 2024-05-06 PROCEDURE — 77067 SCR MAMMO BI INCL CAD: CPT

## 2024-05-07 ENCOUNTER — RESULT REVIEW (OUTPATIENT)
Age: 54
End: 2024-05-07

## 2024-05-13 ENCOUNTER — RESULT REVIEW (OUTPATIENT)
Age: 54
End: 2024-05-13

## 2024-05-13 ENCOUNTER — APPOINTMENT (OUTPATIENT)
Dept: MAMMOGRAPHY | Facility: CLINIC | Age: 54
End: 2024-05-13
Payer: COMMERCIAL

## 2024-05-13 ENCOUNTER — OUTPATIENT (OUTPATIENT)
Dept: OUTPATIENT SERVICES | Facility: HOSPITAL | Age: 54
LOS: 1 days | End: 2024-05-13
Payer: COMMERCIAL

## 2024-05-13 ENCOUNTER — APPOINTMENT (OUTPATIENT)
Dept: ORTHOPEDIC SURGERY | Facility: CLINIC | Age: 54
End: 2024-05-13
Payer: COMMERCIAL

## 2024-05-13 DIAGNOSIS — Z00.8 ENCOUNTER FOR OTHER GENERAL EXAMINATION: ICD-10-CM

## 2024-05-13 DIAGNOSIS — D25.9 LEIOMYOMA OF UTERUS, UNSPECIFIED: Chronic | ICD-10-CM

## 2024-05-13 PROCEDURE — G0279: CPT

## 2024-05-13 PROCEDURE — 99024 POSTOP FOLLOW-UP VISIT: CPT

## 2024-05-13 PROCEDURE — 77065 DX MAMMO INCL CAD UNI: CPT | Mod: 26,RT

## 2024-05-13 PROCEDURE — 77065 DX MAMMO INCL CAD UNI: CPT
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
## 2024-05-17 ENCOUNTER — OUTPATIENT (OUTPATIENT)
Dept: OUTPATIENT SERVICES | Facility: HOSPITAL | Age: 54
LOS: 1 days | End: 2024-05-17
Payer: COMMERCIAL

## 2024-05-17 ENCOUNTER — RESULT REVIEW (OUTPATIENT)
Age: 54
End: 2024-05-17

## 2024-05-17 ENCOUNTER — APPOINTMENT (OUTPATIENT)
Dept: MAMMOGRAPHY | Facility: CLINIC | Age: 54
End: 2024-05-17
Payer: COMMERCIAL

## 2024-05-17 DIAGNOSIS — D25.9 LEIOMYOMA OF UTERUS, UNSPECIFIED: Chronic | ICD-10-CM

## 2024-05-17 DIAGNOSIS — R92.8 OTHER ABNORMAL AND INCONCLUSIVE FINDINGS ON DIAGNOSTIC IMAGING OF BREAST: ICD-10-CM

## 2024-05-17 PROCEDURE — 19081 BX BREAST 1ST LESION STRTCTC: CPT | Mod: RT

## 2024-05-17 PROCEDURE — 19081 BX BREAST 1ST LESION STRTCTC: CPT

## 2024-05-17 PROCEDURE — A4648: CPT

## 2024-05-17 PROCEDURE — 88360 TUMOR IMMUNOHISTOCHEM/MANUAL: CPT | Mod: 26

## 2024-05-17 PROCEDURE — 77065 DX MAMMO INCL CAD UNI: CPT | Mod: 26,RT

## 2024-05-17 PROCEDURE — 88305 TISSUE EXAM BY PATHOLOGIST: CPT

## 2024-05-17 PROCEDURE — 77065 DX MAMMO INCL CAD UNI: CPT

## 2024-05-17 PROCEDURE — 88305 TISSUE EXAM BY PATHOLOGIST: CPT | Mod: 26

## 2024-05-17 PROCEDURE — 88360 TUMOR IMMUNOHISTOCHEM/MANUAL: CPT

## 2024-05-23 ENCOUNTER — NON-APPOINTMENT (OUTPATIENT)
Age: 54
End: 2024-05-23

## 2024-05-29 ENCOUNTER — APPOINTMENT (OUTPATIENT)
Dept: BREAST CENTER | Facility: CLINIC | Age: 54
End: 2024-05-29
Payer: COMMERCIAL

## 2024-05-29 VITALS
WEIGHT: 145 LBS | BODY MASS INDEX: 30.44 KG/M2 | HEART RATE: 67 BPM | SYSTOLIC BLOOD PRESSURE: 126 MMHG | DIASTOLIC BLOOD PRESSURE: 73 MMHG | HEIGHT: 58 IN

## 2024-05-29 DIAGNOSIS — Z80.9 FAMILY HISTORY OF MALIGNANT NEOPLASM, UNSPECIFIED: ICD-10-CM

## 2024-05-29 DIAGNOSIS — Z80.41 FAMILY HISTORY OF MALIGNANT NEOPLASM OF OVARY: ICD-10-CM

## 2024-05-29 DIAGNOSIS — Z83.6 FAMILY HISTORY OF OTHER DISEASES OF THE RESPIRATORY SYSTEM: ICD-10-CM

## 2024-05-29 DIAGNOSIS — Z13.79 ENCOUNTER FOR OTHER SCREENING FOR GENETIC AND CHROMOSOMAL ANOMALIES: ICD-10-CM

## 2024-05-29 DIAGNOSIS — Z80.8 FAMILY HISTORY OF MALIGNANT NEOPLASM OF OTHER ORGANS OR SYSTEMS: ICD-10-CM

## 2024-05-29 DIAGNOSIS — Z80.51 FAMILY HISTORY OF MALIGNANT NEOPLASM OF KIDNEY: ICD-10-CM

## 2024-05-29 DIAGNOSIS — Z72.3 LACK OF PHYSICAL EXERCISE: ICD-10-CM

## 2024-05-29 DIAGNOSIS — Z56.0 UNEMPLOYMENT, UNSPECIFIED: ICD-10-CM

## 2024-05-29 DIAGNOSIS — Z80.0 FAMILY HISTORY OF MALIGNANT NEOPLASM OF DIGESTIVE ORGANS: ICD-10-CM

## 2024-05-29 DIAGNOSIS — Z80.1 FAMILY HISTORY OF MALIGNANT NEOPLASM OF TRACHEA, BRONCHUS AND LUNG: ICD-10-CM

## 2024-05-29 DIAGNOSIS — Z78.9 OTHER SPECIFIED HEALTH STATUS: ICD-10-CM

## 2024-05-29 DIAGNOSIS — Z80.3 FAMILY HISTORY OF MALIGNANT NEOPLASM OF BREAST: ICD-10-CM

## 2024-05-29 PROCEDURE — 99205 OFFICE O/P NEW HI 60 MIN: CPT

## 2024-05-29 PROCEDURE — 99417 PROLNG OP E/M EACH 15 MIN: CPT

## 2024-05-29 RX ORDER — SODIUM PICOSULFATE, MAGNESIUM OXIDE, AND ANHYDROUS CITRIC ACID 12; 3.5; 1 G/175ML; G/175ML; MG/175ML
10-3.5-12 MG-GM LIQUID ORAL
Qty: 1 | Refills: 0 | Status: DISCONTINUED | COMMUNITY
Start: 2023-06-13 | End: 2024-05-29

## 2024-05-29 RX ORDER — NYSTATIN 100000 1/G
100000 POWDER TOPICAL
Qty: 1 | Refills: 1 | Status: ACTIVE | COMMUNITY
Start: 2024-05-29 | End: 1900-01-01

## 2024-05-29 RX ORDER — ZOLPIDEM TARTRATE 5 MG/1
5 TABLET ORAL
Qty: 30 | Refills: 1 | Status: DISCONTINUED | COMMUNITY
Start: 2021-11-08 | End: 2024-05-29

## 2024-05-29 RX ORDER — CHLORHEXIDINE GLUCONATE 4 %
5 LIQUID (ML) TOPICAL
Refills: 0 | Status: ACTIVE | COMMUNITY

## 2024-05-29 RX ORDER — NYSTATIN AND TRIAMCINOLONE ACETONIDE 100000; 1 [USP'U]/G; MG/G
100000-0.1 OINTMENT TOPICAL TWICE DAILY
Qty: 1 | Refills: 1 | Status: ACTIVE | COMMUNITY
Start: 2024-05-29 | End: 1900-01-01

## 2024-05-29 SDOH — ECONOMIC STABILITY - INCOME SECURITY: UNEMPLOYMENT, UNSPECIFIED: Z56.0

## 2024-05-29 NOTE — PAST MEDICAL HISTORY
[Menarche Age ____] : age at menarche was [unfilled] [Approximately ___] : the LMP was approximately [unfilled] [Total Preg ___] : G[unfilled] [Live Births ___] : P[unfilled]  [Age At Live Birth ___] : Age at live birth: [unfilled] [Menopause Age____] : age at menopause was [unfilled] [FreeTextEntry7] : No, IUD x 4 years. [FreeTextEntry8] : 2 years

## 2024-05-29 NOTE — HISTORY OF PRESENT ILLNESS
[FreeTextEntry1] : Ms. Makcey is a 53 year old woman who presents for a consultation for a newly diagnosed right breast DCIS. She prefers for her daughter and sister to do [place] translation for her. Her breast history is notable for a benign right breast needle biopsy. She is asymptomatic. No palpable breast or axillary lumps, nipple discharge, or breast skin changes.   Her family history is not significant for any breast cancer.

## 2024-05-29 NOTE — CONSULT LETTER
[Dear  ___] : Dear  [unfilled], [Consult Letter:] : I had the pleasure of evaluating your patient, [unfilled]. [Please see my note below.] : Please see my note below. [Consult Closing:] : Thank you very much for allowing me to participate in the care of this patient.  If you have any questions, please do not hesitate to contact me. [Sincerely,] : Sincerely, [FreeTextEntry3] : Shelby Moreland MD FACS  [DrGregory  ___] : Dr. CALHOUN

## 2024-05-29 NOTE — ASSESSMENT
[FreeTextEntry1] : Ms. Mackey is a 53 year old woman with a right breast DCIS, clinical stage 0, cTis cN0. The imaging is reviewed with her. A bilateral breast US and MRI are ordered to rule out other foci of disease.  Given her diagnosis of breast cancer and significant family history of ovarian cancer, we discussed genetic testing including the risks, benefits, and implications of the results from negative, to a variant of uncertain significance, to positive. A negative result does not exclude the possibility of a gene mutation that is at work but has not yet been discovered, and the interpretation of genetic testing results is to be done in conjunction with consideration of the family history. With a variant of uncertain significance, there is insufficient evidence to determine if the finding is benign or pathogenic; management is not changed based on variants of uncertain significance, and should new information leads to a change in the classification, the patient will be updated. With a positive gene mutation, management can change. Management options for carrying a mutation with an increased risk of breast cancer range from heightened surveillance with breast MRI to prophylactic mastectomies. Depending on the mutation, there may be an elevated risk for other types of cancer which would influence management accordingly. Emotional, family, insurance, and cost concerns are reviewed. Written information is provided. Ms. Mackey is interested, and genetic testing is drawn.  We reviewed that DCIS is ductal carcinoma in situ and represents a non-invasive breast cancer. The options for surgical treatment are a lumpectomy with radiation vs. a mastectomy. The procedure, risks, and benefits are reviewed. As her tumor is not palpable, preoperative localization with a Magseed with the Radiologist will be needed. The possibility of a re-excision to achieve negative margins is discussed. With a mastectomy, there are different techniques including the nipple-sparing approach. The loss of sensation and possibility of nipple loss are reviewed. Should any cancer cells be discovered in the intranipple nipple on final pathology, an excision of the nipple areolar complex would be recommended. As removal of the breast precludes accurate sentinel node mapping afterwards, a Magtrace injection would be performed at the time of the mastectomy in case an invasive cancer is later discovered. A consultation with a Plastic Surgeon would be arranged to discuss options for breast reconstruction which range from implants to autologous flaps.  Ms. Mackey would like to think about her options.  Final surgical pathology will determine subsequent treatment. Chemotherapy is not indicated for DCIS. Referrals to a Medical Oncologist and Radiation Oncologist will be provided as needed.   Lastly, scripts for nystatin powder, and nystatin triamcinolone ointment are sent to her pharmacy for the left inframammary rash.  I would like to see her back for a follow-up in 2 weeks to review the US, MRI, genetic testing, and final approach to treatment. She understands and is comfortable with the plan. She is encouraged to call if any questions or concerns arise.

## 2024-05-29 NOTE — PHYSICAL EXAM
[Normocephalic] : normocephalic [Atraumatic] : atraumatic [Sclera nonicteric] : sclera nonicteric [Supple] : supple [No Supraclavicular Adenopathy] : no supraclavicular adenopathy [No Cervical Adenopathy] : no cervical adenopathy [Clear to Auscultation Bilat] : clear to auscultation bilaterally [Normal Sinus Rhythm] : normal sinus rhythm [Examined in the supine and seated position] : examined in the supine and seated position [Bra Size: ___] : Bra Size: [unfilled] [No dominant masses] : no dominant masses in right breast  [No dominant masses] : no dominant masses left breast [No Nipple Retraction] : no left nipple retraction [No Nipple Discharge] : no left nipple discharge [No Axillary Lymphadenopathy] : no left axillary lymphadenopathy [No Edema] : no edema [No Ulceration] : no ulceration [Symmetrical] : symmetrical [de-identified] : Erythematous rash in inframammary fold [de-identified] : Right hand dominance

## 2024-06-02 ENCOUNTER — TRANSCRIPTION ENCOUNTER (OUTPATIENT)
Age: 54
End: 2024-06-02

## 2024-06-03 ENCOUNTER — RESULT REVIEW (OUTPATIENT)
Age: 54
End: 2024-06-03

## 2024-06-03 ENCOUNTER — OUTPATIENT (OUTPATIENT)
Dept: OUTPATIENT SERVICES | Facility: HOSPITAL | Age: 54
LOS: 1 days | End: 2024-06-03
Payer: COMMERCIAL

## 2024-06-03 ENCOUNTER — APPOINTMENT (OUTPATIENT)
Dept: ORTHOPEDIC SURGERY | Facility: CLINIC | Age: 54
End: 2024-06-03
Payer: COMMERCIAL

## 2024-06-03 ENCOUNTER — APPOINTMENT (OUTPATIENT)
Dept: ULTRASOUND IMAGING | Facility: CLINIC | Age: 54
End: 2024-06-03
Payer: COMMERCIAL

## 2024-06-03 DIAGNOSIS — R92.30 DENSE BREASTS, UNSPECIFIED: ICD-10-CM

## 2024-06-03 DIAGNOSIS — D25.9 LEIOMYOMA OF UTERUS, UNSPECIFIED: Chronic | ICD-10-CM

## 2024-06-03 DIAGNOSIS — D05.11 INTRADUCTAL CARCINOMA IN SITU OF RIGHT BREAST: ICD-10-CM

## 2024-06-03 PROCEDURE — 76641 ULTRASOUND BREAST COMPLETE: CPT | Mod: 26,50

## 2024-06-03 PROCEDURE — 99024 POSTOP FOLLOW-UP VISIT: CPT

## 2024-06-03 PROCEDURE — 76641 ULTRASOUND BREAST COMPLETE: CPT

## 2024-06-03 NOTE — ADDENDUM
[FreeTextEntry1] : I, Jeromy Coats, acted solely as a scribe for Dr. Cortez on this date on 06/03/2024.

## 2024-06-03 NOTE — HISTORY OF PRESENT ILLNESS
[de-identified] : 32 days postoperative. [de-identified] : 32 days status post left median nerve repair.  Date of surgery: 5/2/2024.  She complains of continued weakness, numbness, and swelling near her thumb.  She was accompanied by her daughter today, who assisted in translation. [de-identified] : Examination of her left wrist and hand demonstrates her incision to be healing well.  There is decreased swelling..  She has numbness along the radial and ulnar digital nerves of the thumb as well as along the palmar cutaneous branch of the thumb.  She has intact sensation to light touch distally today along the index finger radial digital nerve and throughout the remainder of the digits. [de-identified] : Stable, 32 days postoperative.  She does have pain along the incision. [de-identified] : She was instructed on continued range of motion exercises and scar massage and desensitization. I also provided a referral for therapy. She will follow-up in 6 weeks.  Again, I discussed with her and her daughter that it is too early to expect any improvement in her sensation, and this can sometimes take upwards of 3 to 4 months.  They do understand that there are no guarantees that her sensation will fully recover over time.

## 2024-06-03 NOTE — END OF VISIT
[FreeTextEntry3] : This note was written by Jeromy Coats on 06/03/2024 acting solely as a scribe for Dr. Fernando Cortez.   All medical record entries made by the Scribe were at my, Dr. Fernando Cortez, direction and personally dictated by me on 06/03/2024. I have personally reviewed the chart and agree that the record accurately reflects my personal performance of the history, physical exam, assessment and plan.

## 2024-06-05 ENCOUNTER — NON-APPOINTMENT (OUTPATIENT)
Age: 54
End: 2024-06-05

## 2024-06-11 ENCOUNTER — APPOINTMENT (OUTPATIENT)
Dept: MRI IMAGING | Facility: CLINIC | Age: 54
End: 2024-06-11

## 2024-06-11 PROCEDURE — A9585: CPT | Mod: JW

## 2024-06-11 PROCEDURE — 77049 MRI BREAST C-+ W/CAD BI: CPT

## 2024-06-12 ENCOUNTER — APPOINTMENT (OUTPATIENT)
Dept: BREAST CENTER | Facility: CLINIC | Age: 54
End: 2024-06-12
Payer: COMMERCIAL

## 2024-06-12 VITALS
DIASTOLIC BLOOD PRESSURE: 68 MMHG | HEART RATE: 85 BPM | SYSTOLIC BLOOD PRESSURE: 113 MMHG | WEIGHT: 137 LBS | BODY MASS INDEX: 28.76 KG/M2 | HEIGHT: 58 IN

## 2024-06-12 DIAGNOSIS — R92.8 OTHER ABNORMAL AND INCONCLUSIVE FINDINGS ON DIAGNOSTIC IMAGING OF BREAST: ICD-10-CM

## 2024-06-12 PROCEDURE — 99214 OFFICE O/P EST MOD 30 MIN: CPT

## 2024-06-13 PROBLEM — R92.8 ABNORMAL ULTRASOUND OF BREAST: Status: ACTIVE | Noted: 2024-06-13
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
## 2024-06-13 NOTE — CONSULT LETTER
[Dear  ___] : Dear  [unfilled], [Consult Letter:] : I had the pleasure of evaluating your patient, [unfilled]. [Please see my note below.] : Please see my note below. [Consult Closing:] : Thank you very much for allowing me to participate in the care of this patient.  If you have any questions, please do not hesitate to contact me. [Sincerely,] : Sincerely, [DrGregory  ___] : Dr. CALHOUN [FreeTextEntry3] : Shelby Moreland MD FACS

## 2024-06-13 NOTE — HISTORY OF PRESENT ILLNESS
[FreeTextEntry1] : Ms. Mackey is a 53 year old woman who presents for a follow-up of a right breast DCIS. She prefers for her brother to do Marshallese translation for her today. Her breast history is notable for a benign right breast needle biopsy. She is asymptomatic. No palpable breast or axillary lumps, nipple discharge, or breast skin changes.   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 Her family history is not significant for any breast cancer.

## 2024-06-13 NOTE — PHYSICAL EXAM
[Normocephalic] : normocephalic [Atraumatic] : atraumatic [Sclera nonicteric] : sclera nonicteric [Examined in the supine and seated position] : examined in the supine and seated position [Symmetrical] : symmetrical [Bra Size: ___] : Bra Size: [unfilled] [No dominant masses] : no dominant masses in right breast  [No dominant masses] : no dominant masses left breast [No Nipple Retraction] : no left nipple retraction [No Nipple Discharge] : no left nipple discharge [No Axillary Lymphadenopathy] : no left axillary lymphadenopathy [No Edema] : no edema [No Ulceration] : no ulceration [de-identified] : Erythematous rash in inframammary fold [de-identified] : Right hand dominance

## 2024-06-13 NOTE — ASSESSMENT
[FreeTextEntry1] : Ms. Mackey is a 53 year old woman with a right breast DCIS, clinical stage 0, cTis cN0. The US and MRI are reviewed with her. The areas of abnormality on the mammogram, US, and MRI all appear to be in the same region which will be resected due to the DCIS; her case will be further discussed with the Radiologist. 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 She prefers to have breast conservation. The procedure of a right lumpectomy with Magseed localization is reviewed. Surgery will be scheduled. Perioperative care is reviewed.   In addition, she has a new right breast nodule in a separate location on US. This does not have any abnormal MRI enhancement. This will be followed with a right breast US in 6 months.  She understands and is comfortable with the plan. She is encouraged to call if any questions or concerns arise.   After the visit, her case was discussed with the Radiologist. The abnormal nodule at 12:00 N3 on US is in the region of the abnormal nonmass enhancement on MRI and calcifications showing DCIS on mammogram. An ultrasound will be performed, and if still present and not a post-biopsy seroma, a Magseed localization of the abnormal nodule at 12:00 N3 will be performed. Based on the Magseed location on the post-biopsy mammogram, a second Magseed will be placed via mammography to ensure bracketing of the calcifications and biopsy clip which marks the inferior extent of the abnormal MRI enhancement.

## 2024-06-19 ENCOUNTER — OUTPATIENT (OUTPATIENT)
Dept: OUTPATIENT SERVICES | Facility: HOSPITAL | Age: 54
LOS: 1 days | End: 2024-06-19
Payer: COMMERCIAL

## 2024-06-19 DIAGNOSIS — D25.9 LEIOMYOMA OF UTERUS, UNSPECIFIED: Chronic | ICD-10-CM

## 2024-06-19 DIAGNOSIS — Z98.890 OTHER SPECIFIED POSTPROCEDURAL STATES: Chronic | ICD-10-CM

## 2024-06-19 DIAGNOSIS — Z01.818 ENCOUNTER FOR OTHER PREPROCEDURAL EXAMINATION: ICD-10-CM

## 2024-06-19 DIAGNOSIS — D05.11 INTRADUCTAL CARCINOMA IN SITU OF RIGHT BREAST: ICD-10-CM

## 2024-06-19 LAB
ANION GAP SERPL CALC-SCNC: 4 MMOL/L — LOW (ref 5–17)
APTT BLD: 31 SEC — SIGNIFICANT CHANGE UP (ref 24.5–35.6)
BASOPHILS # BLD AUTO: 0.05 K/UL — SIGNIFICANT CHANGE UP (ref 0–0.2)
BASOPHILS NFR BLD AUTO: 0.8 % — SIGNIFICANT CHANGE UP (ref 0–2)
BUN SERPL-MCNC: 16 MG/DL — SIGNIFICANT CHANGE UP (ref 7–23)
CALCIUM SERPL-MCNC: 9.4 MG/DL — SIGNIFICANT CHANGE UP (ref 8.5–10.1)
CHLORIDE SERPL-SCNC: 109 MMOL/L — HIGH (ref 96–108)
CO2 SERPL-SCNC: 26 MMOL/L — SIGNIFICANT CHANGE UP (ref 22–31)
CREAT SERPL-MCNC: 0.61 MG/DL — SIGNIFICANT CHANGE UP (ref 0.5–1.3)
EGFR: 106 ML/MIN/1.73M2 — SIGNIFICANT CHANGE UP
EOSINOPHIL # BLD AUTO: 0.99 K/UL — HIGH (ref 0–0.5)
EOSINOPHIL NFR BLD AUTO: 15.8 % — HIGH (ref 0–6)
GLUCOSE SERPL-MCNC: 97 MG/DL — SIGNIFICANT CHANGE UP (ref 70–99)
HCT VFR BLD CALC: 38.9 % — SIGNIFICANT CHANGE UP (ref 34.5–45)
HGB BLD-MCNC: 12.6 G/DL — SIGNIFICANT CHANGE UP (ref 11.5–15.5)
IMM GRANULOCYTES NFR BLD AUTO: 0.2 % — SIGNIFICANT CHANGE UP (ref 0–0.9)
INR BLD: 0.87 RATIO — SIGNIFICANT CHANGE UP (ref 0.85–1.18)
LYMPHOCYTES # BLD AUTO: 1.7 K/UL — SIGNIFICANT CHANGE UP (ref 1–3.3)
LYMPHOCYTES # BLD AUTO: 27.1 % — SIGNIFICANT CHANGE UP (ref 13–44)
MCHC RBC-ENTMCNC: 27.3 PG — SIGNIFICANT CHANGE UP (ref 27–34)
MCHC RBC-ENTMCNC: 32.4 GM/DL — SIGNIFICANT CHANGE UP (ref 32–36)
MCV RBC AUTO: 84.4 FL — SIGNIFICANT CHANGE UP (ref 80–100)
MONOCYTES # BLD AUTO: 0.42 K/UL — SIGNIFICANT CHANGE UP (ref 0–0.9)
MONOCYTES NFR BLD AUTO: 6.7 % — SIGNIFICANT CHANGE UP (ref 2–14)
NEUTROPHILS # BLD AUTO: 3.1 K/UL — SIGNIFICANT CHANGE UP (ref 1.8–7.4)
NEUTROPHILS NFR BLD AUTO: 49.4 % — SIGNIFICANT CHANGE UP (ref 43–77)
PLATELET # BLD AUTO: 210 K/UL — SIGNIFICANT CHANGE UP (ref 150–400)
POTASSIUM SERPL-MCNC: 3.8 MMOL/L — SIGNIFICANT CHANGE UP (ref 3.5–5.3)
POTASSIUM SERPL-SCNC: 3.8 MMOL/L — SIGNIFICANT CHANGE UP (ref 3.5–5.3)
PROTHROM AB SERPL-ACNC: 9.9 SEC — SIGNIFICANT CHANGE UP (ref 9.5–13)
RBC # BLD: 4.61 M/UL — SIGNIFICANT CHANGE UP (ref 3.8–5.2)
RBC # FLD: 13.7 % — SIGNIFICANT CHANGE UP (ref 10.3–14.5)
SODIUM SERPL-SCNC: 139 MMOL/L — SIGNIFICANT CHANGE UP (ref 135–145)
WBC # BLD: 6.27 K/UL — SIGNIFICANT CHANGE UP (ref 3.8–10.5)
WBC # FLD AUTO: 6.27 K/UL — SIGNIFICANT CHANGE UP (ref 3.8–10.5)

## 2024-06-19 PROCEDURE — 85025 COMPLETE CBC W/AUTO DIFF WBC: CPT

## 2024-06-19 PROCEDURE — 93010 ELECTROCARDIOGRAM REPORT: CPT

## 2024-06-19 PROCEDURE — 93005 ELECTROCARDIOGRAM TRACING: CPT

## 2024-06-19 PROCEDURE — 80048 BASIC METABOLIC PNL TOTAL CA: CPT

## 2024-06-19 PROCEDURE — 36415 COLL VENOUS BLD VENIPUNCTURE: CPT

## 2024-06-19 PROCEDURE — 85610 PROTHROMBIN TIME: CPT

## 2024-06-19 PROCEDURE — 85730 THROMBOPLASTIN TIME PARTIAL: CPT

## 2024-06-20 DIAGNOSIS — D05.11 INTRADUCTAL CARCINOMA IN SITU OF RIGHT BREAST: ICD-10-CM

## 2024-06-20 DIAGNOSIS — Z01.818 ENCOUNTER FOR OTHER PREPROCEDURAL EXAMINATION: ICD-10-CM

## 2024-06-21 ENCOUNTER — APPOINTMENT (OUTPATIENT)
Dept: FAMILY MEDICINE | Facility: CLINIC | Age: 54
End: 2024-06-21
Payer: COMMERCIAL

## 2024-06-21 VITALS
BODY MASS INDEX: 30.44 KG/M2 | DIASTOLIC BLOOD PRESSURE: 60 MMHG | HEART RATE: 81 BPM | HEIGHT: 58 IN | SYSTOLIC BLOOD PRESSURE: 100 MMHG | WEIGHT: 145 LBS | OXYGEN SATURATION: 97 % | TEMPERATURE: 98.2 F

## 2024-06-21 DIAGNOSIS — I10 ESSENTIAL (PRIMARY) HYPERTENSION: ICD-10-CM

## 2024-06-21 DIAGNOSIS — D05.11 INTRADUCTAL CARCINOMA IN SITU OF RIGHT BREAST: ICD-10-CM

## 2024-06-21 DIAGNOSIS — R92.8 OTHER ABNORMAL AND INCONCLUSIVE FINDINGS ON DIAGNOSTIC IMAGING OF BREAST: ICD-10-CM

## 2024-06-21 DIAGNOSIS — R21 RASH AND OTHER NONSPECIFIC SKIN ERUPTION: ICD-10-CM

## 2024-06-21 DIAGNOSIS — R92.30 DENSE BREASTS, UNSPECIFIED: ICD-10-CM

## 2024-06-21 DIAGNOSIS — S54.12XA INJURY OF MEDIAN NERVE AT FOREARM LEVEL, LEFT ARM, INITIAL ENCOUNTER: ICD-10-CM

## 2024-06-21 PROCEDURE — 99215 OFFICE O/P EST HI 40 MIN: CPT

## 2024-06-21 NOTE — RESULTS/DATA
[] : results reviewed [de-identified] : WNL [de-identified] : WNL [de-identified] : WNL [de-identified] : NSR at 64 BPM

## 2024-06-21 NOTE — CONSULT LETTER
[Dear  ___] : Dear  [unfilled], [( Thank you for referring [unfilled] for consultation for _____ )] : Thank you for referring [unfilled] for consultation for [unfilled] [Please see my note below.] : Please see my note below. [Consult Closing:] : Thank you very much for allowing me to participate in the care of this patient.  If you have any questions, please do not hesitate to contact me. [Sincerely,] : Sincerely, [FreeTextEntry3] : Melvin Miranda MD, FAAFP Chair, Family Medicine, [facility] , Family Medicine, [employer], [facility] , Family Medicine, [employer] , Family and Community Medicine, [facility]

## 2024-06-21 NOTE — PLAN
[FreeTextEntry1] : The patient has been advised to remain NPO after the midright prior to surgery. Risks and benefits of the surgery have been discussed by the operative physician. The patient has been advised to avoid taking aspirin and aspirin containing products from now until surgery. The patient has been advised to take the amlodipine on the morning of surgery with small sip of water. Thank you for including me in the care of your patient.

## 2024-06-21 NOTE — HISTORY OF PRESENT ILLNESS
[No Pertinent Cardiac History] : no history of aortic stenosis, atrial fibrillation, coronary artery disease, recent myocardial infarction, or implantable device/pacemaker [No Pertinent Pulmonary History] : no history of asthma, COPD, sleep apnea, or smoking [No Adverse Anesthesia Reaction] : no adverse anesthesia reaction in self or family member [Chronic Anticoagulation] : no chronic anticoagulation [Chronic Kidney Disease] : no chronic kidney disease [Diabetes] : no diabetes [(Patient denies any chest pain, claudication, dyspnea on exertion, orthopnea, palpitations or syncope)] : Patient denies any chest pain, claudication, dyspnea on exertion, orthopnea, palpitations or syncope [Good (7-10 METs)] : Good (7-10 METs) [Anti-Platelet Agents: _____] : Anti-Platelet Agents: [unfilled] [NSAIDs: _____] : NSAIDs: [unfilled] [Herbal Supplements: _____] : Herbal Supplements: [unfilled] [FreeTextEntry1] : lumpectomy right breast [FreeTextEntry2] : 06/28/2024 [FreeTextEntry3] : Shelby Moreland [FreeTextEntry4] : Pt is here for medical clearance.

## 2024-06-24 ENCOUNTER — APPOINTMENT (OUTPATIENT)
Dept: ULTRASOUND IMAGING | Facility: CLINIC | Age: 54
End: 2024-06-24
Payer: COMMERCIAL

## 2024-06-24 ENCOUNTER — APPOINTMENT (OUTPATIENT)
Dept: MAMMOGRAPHY | Facility: CLINIC | Age: 54
End: 2024-06-24
Payer: COMMERCIAL

## 2024-06-24 ENCOUNTER — OUTPATIENT (OUTPATIENT)
Dept: OUTPATIENT SERVICES | Facility: HOSPITAL | Age: 54
LOS: 1 days | End: 2024-06-24
Payer: COMMERCIAL

## 2024-06-24 DIAGNOSIS — D05.11 INTRADUCTAL CARCINOMA IN SITU OF RIGHT BREAST: ICD-10-CM

## 2024-06-24 DIAGNOSIS — Z98.890 OTHER SPECIFIED POSTPROCEDURAL STATES: Chronic | ICD-10-CM

## 2024-06-24 DIAGNOSIS — D25.9 LEIOMYOMA OF UTERUS, UNSPECIFIED: Chronic | ICD-10-CM

## 2024-06-24 DIAGNOSIS — Z00.8 ENCOUNTER FOR OTHER GENERAL EXAMINATION: ICD-10-CM

## 2024-06-24 DIAGNOSIS — R92.8 OTHER ABNORMAL AND INCONCLUSIVE FINDINGS ON DIAGNOSTIC IMAGING OF BREAST: ICD-10-CM

## 2024-06-24 PROBLEM — N63.10 UNSPECIFIED LUMP IN THE RIGHT BREAST, UNSPECIFIED QUADRANT: Chronic | Status: ACTIVE | Noted: 2024-06-19

## 2024-06-24 PROBLEM — I10 ESSENTIAL (PRIMARY) HYPERTENSION: Chronic | Status: ACTIVE | Noted: 2024-06-19

## 2024-06-24 PROBLEM — S61.412A LACERATION WITHOUT FOREIGN BODY OF LEFT HAND, INITIAL ENCOUNTER: Chronic | Status: ACTIVE | Noted: 2024-06-19

## 2024-06-24 PROCEDURE — 19285 PERQ DEV BREAST 1ST US IMAG: CPT | Mod: RT

## 2024-06-24 PROCEDURE — 19285 PERQ DEV BREAST 1ST US IMAG: CPT

## 2024-06-24 PROCEDURE — 19281 PERQ DEVICE BREAST 1ST IMAG: CPT | Mod: RT

## 2024-06-24 PROCEDURE — A4648: CPT

## 2024-06-24 PROCEDURE — 19281 PERQ DEVICE BREAST 1ST IMAG: CPT | Mod: RT,59

## 2024-06-24 PROCEDURE — 19281 PERQ DEVICE BREAST 1ST IMAG: CPT

## 2024-06-27 ENCOUNTER — NON-APPOINTMENT (OUTPATIENT)
Age: 54
End: 2024-06-27

## 2024-06-27 RX ORDER — FENTANYL CITRATE 50 UG/ML
50 INJECTION, SOLUTION INTRAMUSCULAR; INTRAVENOUS
Refills: 0 | Status: DISCONTINUED | OUTPATIENT
Start: 2024-06-28 | End: 2024-06-28

## 2024-06-27 RX ORDER — ONDANSETRON HYDROCHLORIDE 2 MG/ML
4 INJECTION INTRAMUSCULAR; INTRAVENOUS ONCE
Refills: 0 | Status: DISCONTINUED | OUTPATIENT
Start: 2024-06-28 | End: 2024-06-28

## 2024-06-27 RX ORDER — DEXTROSE MONOHYDRATE AND SODIUM CHLORIDE 5; .3 G/100ML; G/100ML
1000 INJECTION, SOLUTION INTRAVENOUS
Refills: 0 | Status: DISCONTINUED | OUTPATIENT
Start: 2024-06-28 | End: 2024-06-28

## 2024-06-27 RX ORDER — FENTANYL CITRATE 50 UG/ML
25 INJECTION, SOLUTION INTRAMUSCULAR; INTRAVENOUS
Refills: 0 | Status: DISCONTINUED | OUTPATIENT
Start: 2024-06-28 | End: 2024-06-28

## 2024-06-28 ENCOUNTER — RESULT REVIEW (OUTPATIENT)
Age: 54
End: 2024-06-28

## 2024-06-28 ENCOUNTER — TRANSCRIPTION ENCOUNTER (OUTPATIENT)
Age: 54
End: 2024-06-28

## 2024-06-28 ENCOUNTER — OUTPATIENT (OUTPATIENT)
Dept: INPATIENT UNIT | Facility: HOSPITAL | Age: 54
LOS: 1 days | Discharge: HOME | End: 2024-06-28
Payer: COMMERCIAL

## 2024-06-28 ENCOUNTER — APPOINTMENT (OUTPATIENT)
Dept: BREAST CENTER | Facility: HOSPITAL | Age: 54
End: 2024-06-28

## 2024-06-28 VITALS
RESPIRATION RATE: 14 BRPM | HEART RATE: 75 BPM | SYSTOLIC BLOOD PRESSURE: 114 MMHG | OXYGEN SATURATION: 98 % | DIASTOLIC BLOOD PRESSURE: 69 MMHG | TEMPERATURE: 98 F

## 2024-06-28 VITALS
RESPIRATION RATE: 16 BRPM | OXYGEN SATURATION: 99 % | DIASTOLIC BLOOD PRESSURE: 70 MMHG | HEIGHT: 58 IN | WEIGHT: 145.06 LBS | SYSTOLIC BLOOD PRESSURE: 125 MMHG | HEART RATE: 62 BPM | TEMPERATURE: 97 F

## 2024-06-28 DIAGNOSIS — I10 ESSENTIAL (PRIMARY) HYPERTENSION: ICD-10-CM

## 2024-06-28 DIAGNOSIS — N60.31 FIBROSCLEROSIS OF RIGHT BREAST: ICD-10-CM

## 2024-06-28 DIAGNOSIS — E66.9 OBESITY, UNSPECIFIED: ICD-10-CM

## 2024-06-28 DIAGNOSIS — D25.9 LEIOMYOMA OF UTERUS, UNSPECIFIED: Chronic | ICD-10-CM

## 2024-06-28 DIAGNOSIS — D05.11 INTRADUCTAL CARCINOMA IN SITU OF RIGHT BREAST: ICD-10-CM

## 2024-06-28 DIAGNOSIS — Z98.890 OTHER SPECIFIED POSTPROCEDURAL STATES: Chronic | ICD-10-CM

## 2024-06-28 LAB — HCG UR QL: NEGATIVE — SIGNIFICANT CHANGE UP

## 2024-06-28 PROCEDURE — 88305 TISSUE EXAM BY PATHOLOGIST: CPT | Mod: 26

## 2024-06-28 PROCEDURE — 88307 TISSUE EXAM BY PATHOLOGIST: CPT

## 2024-06-28 PROCEDURE — 88307 TISSUE EXAM BY PATHOLOGIST: CPT | Mod: 26

## 2024-06-28 PROCEDURE — 14301 TIS TRNFR ANY 30.1-60 SQ CM: CPT

## 2024-06-28 PROCEDURE — 88305 TISSUE EXAM BY PATHOLOGIST: CPT

## 2024-06-28 PROCEDURE — 19301 PARTIAL MASTECTOMY: CPT

## 2024-06-28 PROCEDURE — 76098 X-RAY EXAM SURGICAL SPECIMEN: CPT

## 2024-06-28 PROCEDURE — 81025 URINE PREGNANCY TEST: CPT

## 2024-06-28 PROCEDURE — 76098 X-RAY EXAM SURGICAL SPECIMEN: CPT | Mod: 26

## 2024-06-28 RX ORDER — OXYCODONE HYDROCHLORIDE 100 MG/5ML
5 SOLUTION ORAL ONCE
Refills: 0 | Status: DISCONTINUED | OUTPATIENT
Start: 2024-06-28 | End: 2024-06-28

## 2024-06-28 RX ORDER — OXYCODONE HYDROCHLORIDE 100 MG/5ML
1 SOLUTION ORAL
Qty: 8 | Refills: 0
Start: 2024-06-28

## 2024-06-28 RX ORDER — AMLODIPINE BESYLATE 2.5 MG/1
1 TABLET ORAL
Refills: 0 | DISCHARGE

## 2024-06-28 RX ADMIN — OXYCODONE HYDROCHLORIDE 5 MILLIGRAM(S): 100 SOLUTION ORAL at 14:10

## 2024-07-02 ENCOUNTER — NON-APPOINTMENT (OUTPATIENT)
Age: 54
End: 2024-07-02

## 2024-07-03 LAB — SURGICAL PATHOLOGY STUDY: SIGNIFICANT CHANGE UP

## 2024-07-08 ENCOUNTER — OUTPATIENT (OUTPATIENT)
Dept: OUTPATIENT SERVICES | Facility: HOSPITAL | Age: 54
LOS: 1 days | End: 2024-07-08
Payer: COMMERCIAL

## 2024-07-08 ENCOUNTER — APPOINTMENT (OUTPATIENT)
Dept: BREAST CENTER | Facility: CLINIC | Age: 54
End: 2024-07-08
Payer: COMMERCIAL

## 2024-07-08 ENCOUNTER — APPOINTMENT (OUTPATIENT)
Dept: ULTRASOUND IMAGING | Facility: CLINIC | Age: 54
End: 2024-07-08
Payer: COMMERCIAL

## 2024-07-08 VITALS
WEIGHT: 145 LBS | HEART RATE: 56 BPM | SYSTOLIC BLOOD PRESSURE: 129 MMHG | BODY MASS INDEX: 30.44 KG/M2 | DIASTOLIC BLOOD PRESSURE: 80 MMHG | HEIGHT: 58 IN

## 2024-07-08 DIAGNOSIS — Z00.8 ENCOUNTER FOR OTHER GENERAL EXAMINATION: ICD-10-CM

## 2024-07-08 DIAGNOSIS — R92.8 OTHER ABNORMAL AND INCONCLUSIVE FINDINGS ON DIAGNOSTIC IMAGING OF BREAST: ICD-10-CM

## 2024-07-08 DIAGNOSIS — D25.9 LEIOMYOMA OF UTERUS, UNSPECIFIED: Chronic | ICD-10-CM

## 2024-07-08 DIAGNOSIS — D05.11 INTRADUCTAL CARCINOMA IN SITU OF RIGHT BREAST: ICD-10-CM

## 2024-07-08 DIAGNOSIS — R10.2 PELVIC AND PERINEAL PAIN: ICD-10-CM

## 2024-07-08 PROCEDURE — 76830 TRANSVAGINAL US NON-OB: CPT

## 2024-07-08 PROCEDURE — 76856 US EXAM PELVIC COMPLETE: CPT

## 2024-07-08 PROCEDURE — 76830 TRANSVAGINAL US NON-OB: CPT | Mod: 26

## 2024-07-08 PROCEDURE — 99024 POSTOP FOLLOW-UP VISIT: CPT

## 2024-07-08 PROCEDURE — 76856 US EXAM PELVIC COMPLETE: CPT | Mod: 26

## 2024-07-09 ENCOUNTER — NON-APPOINTMENT (OUTPATIENT)
Age: 54
End: 2024-07-09

## 2024-07-22 ENCOUNTER — APPOINTMENT (OUTPATIENT)
Dept: ORTHOPEDIC SURGERY | Facility: CLINIC | Age: 54
End: 2024-07-22

## 2024-07-26 ENCOUNTER — OUTPATIENT (OUTPATIENT)
Dept: OUTPATIENT SERVICES | Facility: HOSPITAL | Age: 54
LOS: 1 days | Discharge: HOME | End: 2024-07-26

## 2024-07-26 DIAGNOSIS — Z98.890 OTHER SPECIFIED POSTPROCEDURAL STATES: Chronic | ICD-10-CM

## 2024-07-26 DIAGNOSIS — D25.9 LEIOMYOMA OF UTERUS, UNSPECIFIED: Chronic | ICD-10-CM

## 2024-07-26 DIAGNOSIS — C50.919 MALIGNANT NEOPLASM OF UNSPECIFIED SITE OF UNSPECIFIED FEMALE BREAST: ICD-10-CM

## 2024-07-30 ENCOUNTER — APPOINTMENT (OUTPATIENT)
Dept: HEMATOLOGY ONCOLOGY | Facility: CLINIC | Age: 54
End: 2024-07-30
Payer: COMMERCIAL

## 2024-07-30 VITALS
HEIGHT: 58 IN | BODY MASS INDEX: 29.39 KG/M2 | OXYGEN SATURATION: 96 % | TEMPERATURE: 98.2 F | WEIGHT: 140 LBS | DIASTOLIC BLOOD PRESSURE: 73 MMHG | HEART RATE: 69 BPM | SYSTOLIC BLOOD PRESSURE: 133 MMHG

## 2024-07-30 DIAGNOSIS — D05.11 INTRADUCTAL CARCINOMA IN SITU OF RIGHT BREAST: ICD-10-CM

## 2024-07-30 PROCEDURE — 99205 OFFICE O/P NEW HI 60 MIN: CPT

## 2024-07-30 PROCEDURE — G2211 COMPLEX E/M VISIT ADD ON: CPT

## 2024-07-30 RX ORDER — TAMOXIFEN CITRATE 20 MG/1
20 TABLET, FILM COATED ORAL DAILY
Qty: 90 | Refills: 3 | Status: ACTIVE | COMMUNITY
Start: 2024-07-30 | End: 1900-01-01

## 2024-07-30 NOTE — RESULTS/DATA
[FreeTextEntry1] : #Right breast DCIS- ER/MO+, pTisNx Diagnosed on screening mammogram on 5/6/24. S/p right breast biopsy 5/17/24 which revealed DCIS, intermediate grade, w/ necrosis and calcifications, ER 98%, MO 80%.  Breast MRI on 6/11/24 revealed 2cm of NME in the right breast.  She is now s/p R lumpectomy with Dr. Shelby Moreland on 6/28/24 - DCIS, intermediate grade with central necrosis, measuring 15mm, margins negative, pTisNx.  She has an upcoming appointment with Dr. Arianna Orta from radiation oncology on 8/2/24.  Referred to medical oncology today to discuss adjuvant therapy.  At today's visit we discussed management of high risk breast disorders including ADH, ALH, LCIS and DCIS. We discussed her risk of breast abnormalities in the future including additional high risk lesions or invasive breast cancer which is about twice the risk of the average population.  Options for risk reduction were reviewed. Given her intermediate grade disease with necrosis, recommend full dose tamoxifen (20mg PO daily x 5 years) which provides ~50% reduction in future breast events.  We discussed treatment with tamoxifen at 20mg PO daily x 5 years. Discussed major side effects of tamoxifen such as increased DVT/thrombosis risk, uterine cancer risk and menstrual changes including change in frequency and duration, hot flashes and vaginal dryness. She is s/p hysterectomy for fibroids. She does not take any antidepressants. She does not smoke and has no personal or family hx of DVT/PE/stroke.  Would not recommend arimidex at this time given unclear menopausal status (does not recall if her ovaries were previously removed).  She is agreeable to a trial of tamoxifen. Will start tamoxifen 2-3 weeks after completion of radiation therapy (if indicated).  She will continue regular breast imaging and follow up with breast surgery.  Due for baseline DEXA exam. Start vitamin D3 2000 units daily.  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  All patient questions answered at today's visit. Patient urged to call the office with any questions or concerns.   I personally have spent a total of 60 minutes of time on the date of this encounter reviewing test results, documenting findings, coordinating care and directly consulting with the patient and/or designated family member. Greater than 50% of the face to face encounter time was spent on counseling and/or coordination of care for right breast DCIS.

## 2024-07-30 NOTE — PHYSICAL EXAM
[Fully active, able to carry on all pre-disease performance without restriction] : Status 0 - Fully active, able to carry on all pre-disease performance without restriction [Normal] : affect appropriate [de-identified] : generally well appearing  female, NAD, pleasant  [de-identified] : s/p R lumpectomy, scar tissue vs. seroma palpable in upper right breast, no masses or LAD bilaterally

## 2024-07-30 NOTE — REASON FOR VISIT
[Initial Consultation] : an initial consultation [FreeTextEntry2] : DCIS  [Interpreters_IDNumber] : [id number] [Interpreters_FullName] : Sonido [TWNoteComboBox1] : Kittitian

## 2024-07-30 NOTE — RESULTS/DATA
[FreeTextEntry1] : #Right breast DCIS- ER/NE+, pTisNx Diagnosed on screening mammogram on 5/6/24. S/p right breast biopsy 5/17/24 which revealed DCIS, intermediate grade, w/ necrosis and calcifications, ER 98%, NE 80%.  Breast MRI on 6/11/24 revealed 2cm of NME in the right breast.  She is now s/p R lumpectomy with Dr. Shelby Moreland on 6/28/24 - DCIS, intermediate grade with central necrosis, measuring 15mm, margins negative, pTisNx.  She has an upcoming appointment with Dr. Arianna Orta from radiation oncology on 8/2/24.  Referred to medical oncology today to discuss adjuvant therapy.  At today's visit we discussed management of high risk breast disorders including ADH, ALH, LCIS and DCIS. We discussed her risk of breast abnormalities in the future including additional high risk lesions or invasive breast cancer which is about twice the risk of the average population.  Options for risk reduction were reviewed. Given her intermediate grade disease with necrosis, recommend full dose tamoxifen (20mg PO daily x 5 years) which provides ~50% reduction in future breast events.  We discussed treatment with tamoxifen at 20mg PO daily x 5 years. Discussed major side effects of tamoxifen such as increased DVT/thrombosis risk, uterine cancer risk and menstrual changes including change in frequency and duration, hot flashes and vaginal dryness. She is s/p hysterectomy for fibroids. She does not take any antidepressants. She does not smoke and has no personal or family hx of DVT/PE/stroke.  Would not recommend arimidex at this time given unclear menopausal status (does not recall if her ovaries were previously removed).  She is agreeable to a trial of tamoxifen. Will start tamoxifen 2-3 weeks after completion of radiation therapy (if indicated).  She will continue regular breast imaging and follow up with breast surgery.  Due for baseline DEXA exam. Start vitamin D3 2000 units daily.  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  All patient questions answered at today's visit. Patient urged to call the office with any questions or concerns.   I personally have spent a total of 60 minutes of time on the date of this encounter reviewing test results, documenting findings, coordinating care and directly consulting with the patient and/or designated family member. Greater than 50% of the face to face encounter time was spent on counseling and/or coordination of care for right breast DCIS.

## 2024-07-30 NOTE — HISTORY OF PRESENT ILLNESS
[de-identified] : Referred by: Dr. Shelby Moreland  PCP: Dr. Melvin Miranda Diagnosis: Right breast DCIS  Karlos Mackey (spouse) - ph [phone] Alysia Lyn (daughter) - ph [phone]   Gayle Mackey is a post-menopausal female who presented at age 53 in 2024 for evaluation of right breast DCIS.  The patient has a medical history of HTN, chronic headaches (getting worse, occurring @ hs).  No personal/family hx of VTE. Surgical hx:  Right lumpectomy, hysterectomy (for fibroids - ?), left hand surgery (laceration repair).   Gayle underwent screening mammogram on 24 which revealed calcifications in the right breast, confirmed on diagnostic imaging. She denied any breast complaints at that time including breast mass, nipple discharge or breast pain. Right breast biopsy was performed on 24 and revealed DCIS, intermediate grade, w/ necrosis and calcifications, ER 98%, MN 80%. Breast MRI on 24 revealed 2cm of NME in the right breast. She is now s/p R lumpectomy with Dr. Shelby Moreland on 24 and final pathology was notable for DCIS, intermediate grade with central necrosis, focal retrograde lobular extension, measuring 15mm, margins negative, pTisNx.  She has an upcoming appointment with Dr. Arianna Orta from radiation oncology on 24.  Referred to medical oncology today to discuss adjuvant therapy.   At today's visit she is feeling very well and is in her usual state of health. Denies recent headaches, visual changes, balance issues, CP, cough, SOB, n/v/d, constipation, unintentional weight loss or new bone or back pain.  Imaging reviewed: Screening mammogram 24- new calcifications in the right breast, BIRADS 0 Diagnostic right MMG 24- focal calcifications in the right breast, rec bx, BIRADS 4B MRI breast 24- biopsy clip in the right breast, NME extends approx 2cm superiorly and posteriorly, no contralateral disease or LAD  Pathology reviewed: Right breast biopsy 24- DCIS, intermediate grade, w/ necrosis and calcifications  ER 98%, MN 80% Right lumpectomy 24- DCIS, intermediate grade with central necrosis, focal retrograde lobular extension, measuring 15mm, margins negative pTisNx  Genetics: [facility] panel 24- 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, one pathogenic variant identified, MSH3 heterozygous   HCM: - Colonoscopy: 2023- internal hemorrhoids  - Gyn/pap:  2024 - no hx abnormal Paps - Mammo:  as above - Lung cancer screen:  n/a - DEXA:  n/a   SH: - 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

## 2024-07-30 NOTE — PHYSICAL EXAM
[Fully active, able to carry on all pre-disease performance without restriction] : Status 0 - Fully active, able to carry on all pre-disease performance without restriction [Normal] : affect appropriate [de-identified] : generally well appearing  female, NAD, pleasant  [de-identified] : s/p R lumpectomy, scar tissue vs. seroma palpable in upper right breast, no masses or LAD bilaterally

## 2024-07-30 NOTE — CONSULT LETTER
[Dear  ___] : Dear  [unfilled], [Consult Letter:] : I had the pleasure of evaluating your patient, [unfilled]. [Please see my note below.] : Please see my note below. [Consult Closing:] : Thank you very much for allowing me to participate in the care of this patient.  If you have any questions, please do not hesitate to contact me. [Sincerely,] : Sincerely, [FreeTextEntry2] : Dr. Shelby Moreland  [FreeTextEntry3] : Dr. Fior Patel

## 2024-07-30 NOTE — REASON FOR VISIT
[Initial Consultation] : an initial consultation [FreeTextEntry2] : DCIS  [Interpreters_IDNumber] : [id number] [Interpreters_FullName] : Sonido [TWNoteComboBox1] : Vincentian

## 2024-07-31 ENCOUNTER — OUTPATIENT (OUTPATIENT)
Dept: OUTPATIENT SERVICES | Facility: HOSPITAL | Age: 54
LOS: 1 days | End: 2024-07-31
Payer: COMMERCIAL

## 2024-07-31 ENCOUNTER — APPOINTMENT (OUTPATIENT)
Dept: RADIOLOGY | Facility: CLINIC | Age: 54
End: 2024-07-31
Payer: COMMERCIAL

## 2024-07-31 DIAGNOSIS — D05.11 INTRADUCTAL CARCINOMA IN SITU OF RIGHT BREAST: ICD-10-CM

## 2024-07-31 DIAGNOSIS — D25.9 LEIOMYOMA OF UTERUS, UNSPECIFIED: Chronic | ICD-10-CM

## 2024-07-31 DIAGNOSIS — Z00.8 ENCOUNTER FOR OTHER GENERAL EXAMINATION: ICD-10-CM

## 2024-07-31 DIAGNOSIS — Z98.890 OTHER SPECIFIED POSTPROCEDURAL STATES: Chronic | ICD-10-CM

## 2024-07-31 PROCEDURE — 77080 DXA BONE DENSITY AXIAL: CPT | Mod: 26

## 2024-07-31 PROCEDURE — 77080 DXA BONE DENSITY AXIAL: CPT

## 2024-08-01 DIAGNOSIS — D05.11 INTRADUCTAL CARCINOMA IN SITU OF RIGHT BREAST: ICD-10-CM

## 2024-08-02 ENCOUNTER — APPOINTMENT (OUTPATIENT)
Dept: RADIATION ONCOLOGY | Facility: CLINIC | Age: 54
End: 2024-08-02
Payer: COMMERCIAL

## 2024-08-02 VITALS
SYSTOLIC BLOOD PRESSURE: 112 MMHG | RESPIRATION RATE: 16 BRPM | OXYGEN SATURATION: 98 % | HEART RATE: 74 BPM | HEIGHT: 59 IN | DIASTOLIC BLOOD PRESSURE: 74 MMHG | WEIGHT: 143 LBS | BODY MASS INDEX: 28.83 KG/M2

## 2024-08-02 DIAGNOSIS — D05.11 INTRADUCTAL CARCINOMA IN SITU OF RIGHT BREAST: ICD-10-CM

## 2024-08-02 PROCEDURE — 99204 OFFICE O/P NEW MOD 45 MIN: CPT

## 2024-08-02 NOTE — VITALS
[Maximal Pain Intensity: 4/10] : 4/10 [Least Pain Intensity: 0/10] : 0/10 [OTC] : OTC [90: Able to carry normal activity; minor signs or symptoms of disease.] : 90: Able to carry normal activity; minor signs or symptoms of disease.  [Date: ____________] : Patient's last distress assessment performed on [unfilled]. [7 - Distress Level] : Distress Level: 7 [Referred Patient  to social work for follow-up] : Patient was referred to social work for follow-up [Patient given social work contact information and resource sheet] : Patient was given social work contact information and resource sheet

## 2024-08-02 NOTE — PHYSICAL EXAM
[General Appearance - Well Developed] : well developed [Breast Abnormal Lactation (Galactorrhea)] : no nipple discharge [No UE Edema] : there is no upper extremity edema [Cervical Lymph Nodes Enlarged Posterior Bilaterally] : posterior cervical [Cervical Lymph Nodes Enlarged Anterior Bilaterally] : anterior cervical [Supraclavicular Lymph Nodes Enlarged Bilaterally] : supraclavicular [Musculoskeletal - Swelling] : no joint swelling [No Focal Deficits] : no focal deficits [Normal] : oriented to person, place and time, the affect was normal, the mood was normal and not anxious [Oriented To Time, Place, And Person] : oriented to person, place, and time [de-identified] : healing R circumareolar scar with edema and firm cavity superiorly

## 2024-08-02 NOTE — REASON FOR VISIT
[Consideration of Curative Therapy] : consideration of curative therapy for [Breast Cancer] : breast cancer [Spouse] : spouse [Interpreters_IDNumber] : [id number] [Interpreters_FullName] : Grisel [TWNoteComboBox1] : Hong Konger

## 2024-08-02 NOTE — OB/GYN HISTORY
[Approximately ___ (Month)] : the LMP was approximately [unfilled] month(s) ago [Menopause Age: ____] : patient was [unfilled] years old at menopause [___] : Living: [unfilled]

## 2024-08-02 NOTE — HISTORY OF PRESENT ILLNESS
[FreeTextEntry1] : The patient is a pleasant 53 year old Lithuanian speaking female from [facility], diagnosed with right breast DCIS referred by Dr. Moreland.  B/l mammogram 5/6/24 showed  heterogeneously dense breast, - new calcifications in R breast 12:00  Right diagnostic mammogram 5/13/24 - focal heterogenous calcifications in R breast 12:00; R stereotactic bx - BIRADS 4B  Stereotactic biopsy 5/17/24 - R breast 12:00, DCIS, intermediate grade, cribriform pattern, stromal fibrosis and cyst formation. ER 98%, VT 80%  B/l US 6/3/24 - 0.7 cm nodule in R breast 12:00 N3 at biopsied cancer; clip not seen; correlate with MRI, possible US bx - 1.1 cm nodule R breast 10:00 N6, biopsied - 0.57 cm nodule in R breast 9:00 N6, new [ 6 mo R US]  Breast MRI 6/11/24 showed: - bx clip in R upper central breast at inferior aspect of nonmass enhancement which extends 2 cm superiorly and posterior, consistent with post-bx changes and residual disease and residual calcs - no axillary lymphadenopathy  R lumpectomy 6/28/24 by Dr. Moreland. Surgical pathology demonstrated: DCIS, 15 mm size, present in 3/23 blocks, Cribriform, Grade II. "comedo" necrosis and microcalcifications present.  All margins negative and > 10mm. Lymph nodes not submitted. ER 98%, VT 80% positive.  She will be due for a right limited US in December 2024 for a right breast nodule at 9:00.  [facility] genetic test showed one pathogenic variant in NTHL1 which is associated with autosomal recessive polyposis. 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 She had a hysterectomy 13 years ago for heavy bleeding and fibroids. She lives in [place] with her  Karlos who works in housekeeping at [employer]. 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  She reports right breast pain and presents with her  to discuss the role of radiation therapy in her care.

## 2024-08-02 NOTE — REASON FOR VISIT
[Consideration of Curative Therapy] : consideration of curative therapy for [Breast Cancer] : breast cancer [Spouse] : spouse [Interpreters_IDNumber] : [id number] [Interpreters_FullName] : Grisel [TWNoteComboBox1] : Austrian

## 2024-08-02 NOTE — PHYSICAL EXAM
[General Appearance - Well Developed] : well developed [Breast Abnormal Lactation (Galactorrhea)] : no nipple discharge [No UE Edema] : there is no upper extremity edema [Cervical Lymph Nodes Enlarged Posterior Bilaterally] : posterior cervical [Cervical Lymph Nodes Enlarged Anterior Bilaterally] : anterior cervical [Supraclavicular Lymph Nodes Enlarged Bilaterally] : supraclavicular [Musculoskeletal - Swelling] : no joint swelling [No Focal Deficits] : no focal deficits [Normal] : oriented to person, place and time, the affect was normal, the mood was normal and not anxious [Oriented To Time, Place, And Person] : oriented to person, place, and time [de-identified] : healing R circumareolar scar with edema and firm cavity superiorly

## 2024-08-02 NOTE — PHYSICAL EXAM
[General Appearance - Well Developed] : well developed [Breast Abnormal Lactation (Galactorrhea)] : no nipple discharge [No UE Edema] : there is no upper extremity edema [Cervical Lymph Nodes Enlarged Posterior Bilaterally] : posterior cervical [Cervical Lymph Nodes Enlarged Anterior Bilaterally] : anterior cervical [Supraclavicular Lymph Nodes Enlarged Bilaterally] : supraclavicular [Musculoskeletal - Swelling] : no joint swelling [No Focal Deficits] : no focal deficits [Normal] : oriented to person, place and time, the affect was normal, the mood was normal and not anxious [Oriented To Time, Place, And Person] : oriented to person, place, and time [de-identified] : healing R circumareolar scar with edema and firm cavity superiorly

## 2024-08-02 NOTE — REVIEW OF SYSTEMS
[Constipation] : constipation [Negative] : Allergic/Immunologic [de-identified] : Headaches since the surgery

## 2024-08-02 NOTE — REVIEW OF SYSTEMS
[Constipation] : constipation [Negative] : Allergic/Immunologic [de-identified] : Headaches since the surgery

## 2024-08-02 NOTE — REVIEW OF SYSTEMS
[Constipation] : constipation [Negative] : Allergic/Immunologic [de-identified] : Headaches since the surgery

## 2024-08-02 NOTE — REASON FOR VISIT
[Consideration of Curative Therapy] : consideration of curative therapy for [Breast Cancer] : breast cancer [Spouse] : spouse [Interpreters_IDNumber] : [id number] [Interpreters_FullName] : Grisel [TWNoteComboBox1] : Norwegian

## 2024-08-02 NOTE — HISTORY OF PRESENT ILLNESS
[FreeTextEntry1] : The patient is a pleasant 53 year old Estonian speaking female from [facility], diagnosed with right breast DCIS referred by Dr. Moreland.  B/l mammogram 5/6/24 showed  heterogeneously dense breast, - new calcifications in R breast 12:00  Right diagnostic mammogram 5/13/24 - focal heterogenous calcifications in R breast 12:00; R stereotactic bx - BIRADS 4B  Stereotactic biopsy 5/17/24 - R breast 12:00, DCIS, intermediate grade, cribriform pattern, stromal fibrosis and cyst formation. ER 98%, CA 80%  B/l US 6/3/24 - 0.7 cm nodule in R breast 12:00 N3 at biopsied cancer; clip not seen; correlate with MRI, possible US bx - 1.1 cm nodule R breast 10:00 N6, biopsied - 0.57 cm nodule in R breast 9:00 N6, new [ 6 mo R US]  Breast MRI 6/11/24 showed: - bx clip in R upper central breast at inferior aspect of nonmass enhancement which extends 2 cm superiorly and posterior, consistent with post-bx changes and residual disease and residual calcs - no axillary lymphadenopathy  R lumpectomy 6/28/24 by Dr. Moreland. Surgical pathology demonstrated: DCIS, 15 mm size, present in 3/23 blocks, Cribriform, Grade II. "comedo" necrosis and microcalcifications present.  All margins negative and > 10mm. Lymph nodes not submitted. ER 98%, CA 80% positive.  She will be due for a right limited US in December 2024 for a right breast nodule at 9:00.  [facility] genetic test showed one pathogenic variant in NTHL1 which is associated with autosomal recessive polyposis. 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 She had a hysterectomy 13 years ago for heavy bleeding and fibroids. She lives in [facility] [place] [facility] with her  Karlos who works in housekeeping at [employer]. They are from [facility] and care for their 14 yo autistic grandson who is living in their house.  She reports right breast pain and presents with her  to discuss the role of radiation therapy in her care.

## 2024-08-02 NOTE — HISTORY OF PRESENT ILLNESS
[FreeTextEntry1] : The patient is a pleasant 53 year old Kenyan speaking female from [facility], diagnosed with right breast DCIS referred by Dr. Moreland.  B/l mammogram 5/6/24 showed  heterogeneously dense breast, - new calcifications in R breast 12:00  Right diagnostic mammogram 5/13/24 - focal heterogenous calcifications in R breast 12:00; R stereotactic bx - BIRADS 4B  Stereotactic biopsy 5/17/24 - R breast 12:00, DCIS, intermediate grade, cribriform pattern, stromal fibrosis and cyst formation. ER 98%, MS 80%  B/l US 6/3/24 - 0.7 cm nodule in R breast 12:00 N3 at biopsied cancer; clip not seen; correlate with MRI, possible US bx - 1.1 cm nodule R breast 10:00 N6, biopsied - 0.57 cm nodule in R breast 9:00 N6, new [ 6 mo R US]  Breast MRI 6/11/24 showed: - bx clip in R upper central breast at inferior aspect of nonmass enhancement which extends 2 cm superiorly and posterior, consistent with post-bx changes and residual disease and residual calcs - no axillary lymphadenopathy  R lumpectomy 6/28/24 by Dr. Moreland. Surgical pathology demonstrated: DCIS, 15 mm size, present in 3/23 blocks, Cribriform, Grade II. "comedo" necrosis and microcalcifications present.  All margins negative and > 10mm. Lymph nodes not submitted. ER 98%, MS 80% positive.  She will be due for a right limited US in December 2024 for a right breast nodule at 9:00.  [facility] genetic test showed one pathogenic variant in NTHL1 which is associated with autosomal recessive polyposis. 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 She had a hysterectomy 13 years ago for heavy bleeding and fibroids. She lives in [facility] [place] with her  Karlos who works in housekeeping at [employer]. They are from [facility] and care for their 14 yo autistic grandson who is living in their house.  She reports right breast pain and presents with her  to discuss the role of radiation therapy in her care.

## 2024-08-05 PROCEDURE — 77263 THER RADIOLOGY TX PLNG CPLX: CPT

## 2024-08-05 PROCEDURE — 77333 RADIATION TREATMENT AID(S): CPT

## 2024-08-05 PROCEDURE — 77290 THER RAD SIMULAJ FIELD CPLX: CPT

## 2024-08-07 ENCOUNTER — NON-APPOINTMENT (OUTPATIENT)
Age: 54
End: 2024-08-07

## 2024-08-13 PROCEDURE — 77334 RADIATION TREATMENT AID(S): CPT

## 2024-08-13 PROCEDURE — 77295 3-D RADIOTHERAPY PLAN: CPT

## 2024-08-13 PROCEDURE — 77300 RADIATION THERAPY DOSE PLAN: CPT

## 2024-08-15 PROCEDURE — 77280 THER RAD SIMULAJ FIELD SMPL: CPT

## 2024-08-20 ENCOUNTER — NON-APPOINTMENT (OUTPATIENT)
Age: 54
End: 2024-08-20

## 2024-08-20 VITALS
OXYGEN SATURATION: 98 % | BODY MASS INDEX: 28.43 KG/M2 | HEIGHT: 59 IN | HEART RATE: 71 BPM | RESPIRATION RATE: 16 BRPM | WEIGHT: 141 LBS

## 2024-08-20 PROCEDURE — G6012: CPT

## 2024-08-20 PROCEDURE — G6017: CPT

## 2024-08-20 PROCEDURE — 77427 RADIATION TX MANAGEMENT X5: CPT

## 2024-08-20 NOTE — REASON FOR VISIT
[Breast Cancer] : breast cancer [Spouse] : spouse [Routine On-Treatment] : a routine on-treatment visit for [Interpreters_IDNumber] : [id number] [Interpreters_FullName] : Grisel [TWNoteComboBox1] : Hong Konger

## 2024-08-20 NOTE — DISEASE MANAGEMENT
[Pathological] : TNM Stage: p [0] : 0 [TTNM] : is [NTNM] : 0 [MTNM] : 0 [de-identified] : 297 [de-identified] : right breast [de-identified] : 1241

## 2024-08-20 NOTE — HISTORY OF PRESENT ILLNESS
[FreeTextEntry1] : The patient is a pleasant 53 year old Uzbek speaking female from [facility], diagnosed with right breast DCIS referred by Dr. Moreland.  B/l mammogram 5/6/24 showed  heterogeneously dense breast, - new calcifications in R breast 12:00  Right diagnostic mammogram 5/13/24 - focal heterogenous calcifications in R breast 12:00; R stereotactic bx - BIRADS 4B  Stereotactic biopsy 5/17/24 - R breast 12:00, DCIS, intermediate grade, cribriform pattern, stromal fibrosis and cyst formation. ER 98%, LA 80%  B/l US 6/3/24 - 0.7 cm nodule in R breast 12:00 N3 at biopsied cancer; clip not seen; correlate with MRI, possible US bx - 1.1 cm nodule R breast 10:00 N6, biopsied - 0.57 cm nodule in R breast 9:00 N6, new [ 6 mo R US]  Breast MRI 6/11/24 showed: - bx clip in R upper central breast at inferior aspect of nonmass enhancement which extends 2 cm superiorly and posterior, consistent with post-bx changes and residual disease and residual calcs - no axillary lymphadenopathy  R lumpectomy 6/28/24 by Dr. Moreland. Surgical pathology demonstrated: DCIS, 15 mm size, present in 3/23 blocks, Cribriform, Grade II. "comedo" necrosis and microcalcifications present.  All margins negative and > 10mm. Lymph nodes not submitted. ER 98%, LA 80% positive.  She will be due for a right limited US in December 2024 for a right breast nodule at 9:00.  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 She had a hysterectomy 13 years ago for heavy bleeding and fibroids. She lives in [facility] [place] [facility] with her  Karlos who works in housekeeping at [employer]. They are from [facility] and care for their 14 yo autistic grandson who is living in their house.  She reports right breast pain and presents with her  to discuss the role of radiation therapy in her care. She states she is not interested in taking Tamoxifen and opted to begin RT.  8/20/24 Patient is seen for OTV, fx 1/20 . Feels well. Trying reflexology today. Provided with skin care samples and instructions.

## 2024-08-20 NOTE — PHYSICAL EXAM
[Normal] : well developed, well nourished, in no acute distress [de-identified] : G0 erythema R breast

## 2024-08-21 PROCEDURE — G6017: CPT

## 2024-08-21 PROCEDURE — G6012: CPT

## 2024-08-22 PROCEDURE — G6012: CPT

## 2024-08-22 PROCEDURE — G6017: CPT

## 2024-08-23 PROCEDURE — 77417 THER RADIOLOGY PORT IMAGE(S): CPT

## 2024-08-23 PROCEDURE — G6012: CPT

## 2024-08-26 PROCEDURE — G6017: CPT

## 2024-08-26 PROCEDURE — 77336 RADIATION PHYSICS CONSULT: CPT

## 2024-08-26 PROCEDURE — G6012: CPT

## 2024-08-27 ENCOUNTER — NON-APPOINTMENT (OUTPATIENT)
Age: 54
End: 2024-08-27

## 2024-08-27 PROCEDURE — G6012: CPT

## 2024-08-27 PROCEDURE — G6017: CPT

## 2024-08-27 PROCEDURE — 77427 RADIATION TX MANAGEMENT X5: CPT

## 2024-08-27 NOTE — HISTORY OF PRESENT ILLNESS
[FreeTextEntry1] : The patient is a pleasant 53 year old Somali speaking female from [facility], diagnosed with right breast DCIS referred by Dr. Moreland.  B/l mammogram 5/6/24 showed  heterogeneously dense breast, - new calcifications in R breast 12:00  Right diagnostic mammogram 5/13/24 - focal heterogenous calcifications in R breast 12:00; R stereotactic bx - BIRADS 4B  Stereotactic biopsy 5/17/24 - R breast 12:00, DCIS, intermediate grade, cribriform pattern, stromal fibrosis and cyst formation. ER 98%, SC 80%  B/l US 6/3/24 - 0.7 cm nodule in R breast 12:00 N3 at biopsied cancer; clip not seen; correlate with MRI, possible US bx - 1.1 cm nodule R breast 10:00 N6, biopsied - 0.57 cm nodule in R breast 9:00 N6, new [ 6 mo R US]  Breast MRI 6/11/24 showed: - bx clip in R upper central breast at inferior aspect of nonmass enhancement which extends 2 cm superiorly and posterior, consistent with post-bx changes and residual disease and residual calcs - no axillary lymphadenopathy  R lumpectomy 6/28/24 by Dr. Moreland. Surgical pathology demonstrated: DCIS, 15 mm size, present in 3/23 blocks, Cribriform, Grade II. "comedo" necrosis and microcalcifications present.  All margins negative and > 10mm. Lymph nodes not submitted. ER 98%, SC 80% positive.  She will be due for a right limited US in December 2024 for a right breast nodule at 9:00.  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 She had a hysterectomy 13 years ago for heavy bleeding and fibroids. She lives in [facility] [place] [facility] with her  Karlos who works in housekeeping at [employer]. They are from [facility] and care for their 12 yo autistic grandson who is living in their house.  She reports right breast pain and presents with her  to discuss the role of radiation therapy in her care. She states she is not interested in taking Tamoxifen and opted to begin RT.  8/20/24 Patient is seen for OTV, fx 1/20 . Feels well. Trying reflexology today. Provided with skin care samples and instructions.  6/27/24. Patient is seen for OTV, fx 6/15.Feels well. Skin care reviewed. Presents with her daughter today.

## 2024-08-27 NOTE — REASON FOR VISIT
[Routine On-Treatment] : a routine on-treatment visit for [Breast Cancer] : breast cancer [Spouse] : spouse [Interpreters_IDNumber] : [id number] [Interpreters_FullName] : Grisel [TWNoteComboBox1] : Malawian

## 2024-08-27 NOTE — DISEASE MANAGEMENT
[Pathological] : TNM Stage: p [0] : 0 [TTNM] : is [NTNM] : 0 [MTNM] : 0 [de-identified] : 1920 [de-identified] : 4215 [de-identified] : right breast

## 2024-08-27 NOTE — PHYSICAL EXAM
[Normal] : well developed, well nourished, in no acute distress [de-identified] : G0 pilarhma R breast wtih firm scar mass in tumor bed

## 2024-08-28 PROCEDURE — G6017: CPT

## 2024-08-28 PROCEDURE — G6012: CPT

## 2024-08-29 PROCEDURE — G6012: CPT

## 2024-08-29 PROCEDURE — G6017: CPT

## 2024-08-30 PROCEDURE — 77417 THER RADIOLOGY PORT IMAGE(S): CPT

## 2024-08-30 PROCEDURE — G6012: CPT

## 2024-09-03 ENCOUNTER — NON-APPOINTMENT (OUTPATIENT)
Age: 54
End: 2024-09-03

## 2024-09-03 VITALS
HEART RATE: 64 BPM | OXYGEN SATURATION: 98 % | HEIGHT: 59 IN | BODY MASS INDEX: 28.83 KG/M2 | RESPIRATION RATE: 16 BRPM | WEIGHT: 143 LBS

## 2024-09-03 PROCEDURE — G6012: CPT

## 2024-09-03 PROCEDURE — 77336 RADIATION PHYSICS CONSULT: CPT

## 2024-09-03 PROCEDURE — G6017: CPT

## 2024-09-03 NOTE — HISTORY OF PRESENT ILLNESS
[FreeTextEntry1] : The patient is a pleasant 53 year old Kyrgyz speaking female from [facility], diagnosed with right breast DCIS referred by Dr. Moreland.  B/l mammogram 5/6/24 showed  heterogeneously dense breast, - new calcifications in R breast 12:00  Right diagnostic mammogram 5/13/24 - focal heterogenous calcifications in R breast 12:00; R stereotactic bx - BIRADS 4B  Stereotactic biopsy 5/17/24 - R breast 12:00, DCIS, intermediate grade, cribriform pattern, stromal fibrosis and cyst formation. ER 98%, NH 80%  B/l US 6/3/24 - 0.7 cm nodule in R breast 12:00 N3 at biopsied cancer; clip not seen; correlate with MRI, possible US bx - 1.1 cm nodule R breast 10:00 N6, biopsied - 0.57 cm nodule in R breast 9:00 N6, new [ 6 mo R US]  Breast MRI 6/11/24 showed: - bx clip in R upper central breast at inferior aspect of nonmass enhancement which extends 2 cm superiorly and posterior, consistent with post-bx changes and residual disease and residual calcs - no axillary lymphadenopathy  R lumpectomy 6/28/24 by Dr. Moreland. Surgical pathology demonstrated: DCIS, 15 mm size, present in 3/23 blocks, Cribriform, Grade II. "comedo" necrosis and microcalcifications present.  All margins negative and > 10mm. Lymph nodes not submitted. ER 98%, NH 80% positive.  She will be due for a right limited US in December 2024 for a right breast nodule at 9:00.  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 She had a hysterectomy 13 years ago for heavy bleeding and fibroids. She lives in [facility] [facility] with her  Karlos who works in housekeeping at [employer]. They are from [facility] and care for their 14 yo autistic grandson who is living in their house.  She reports right breast pain and presents with her  to discuss the role of radiation therapy in her care. She states she is not interested in taking Tamoxifen and opted to begin RT.  8/20/24 Patient is seen for OTV, fx 1/20 . Feels well. Trying reflexology today. Provided with skin care samples and instructions.  8/27/24. Patient is seen for OTV, fx 6/15.Feels well. Skin care reviewed. Presents with her daughter today.  9/3/24 Patient seen for OTV, fx10/15. Feels well. G0 erythema or hyperpigmentation R breast. Using skin care purchased (does not remember name)

## 2024-09-03 NOTE — HISTORY OF PRESENT ILLNESS
[FreeTextEntry1] : The patient is a pleasant 53 year old Argentine speaking female from [facility], diagnosed with right breast DCIS referred by Dr. Moreland.  B/l mammogram 5/6/24 showed  heterogeneously dense breast, - new calcifications in R breast 12:00  Right diagnostic mammogram 5/13/24 - focal heterogenous calcifications in R breast 12:00; R stereotactic bx - BIRADS 4B  Stereotactic biopsy 5/17/24 - R breast 12:00, DCIS, intermediate grade, cribriform pattern, stromal fibrosis and cyst formation. ER 98%, OH 80%  B/l US 6/3/24 - 0.7 cm nodule in R breast 12:00 N3 at biopsied cancer; clip not seen; correlate with MRI, possible US bx - 1.1 cm nodule R breast 10:00 N6, biopsied - 0.57 cm nodule in R breast 9:00 N6, new [ 6 mo R US]  Breast MRI 6/11/24 showed: - bx clip in R upper central breast at inferior aspect of nonmass enhancement which extends 2 cm superiorly and posterior, consistent with post-bx changes and residual disease and residual calcs - no axillary lymphadenopathy  R lumpectomy 6/28/24 by Dr. Moreland. Surgical pathology demonstrated: DCIS, 15 mm size, present in 3/23 blocks, Cribriform, Grade II. "comedo" necrosis and microcalcifications present.  All margins negative and > 10mm. Lymph nodes not submitted. ER 98%, OH 80% positive.  She will be due for a right limited US in December 2024 for a right breast nodule at 9:00.  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 She had a hysterectomy 13 years ago for heavy bleeding and fibroids. She lives in [facility] with her  Karlos who works in housekeeping at [employer]. They are from [facility] and care for their 12 yo autistic grandson who is living in their house.  She reports right breast pain and presents with her  to discuss the role of radiation therapy in her care. She states she is not interested in taking Tamoxifen and opted to begin RT.  8/20/24 Patient is seen for OTV, fx 1/20 . Feels well. Trying reflexology today. Provided with skin care samples and instructions.  8/27/24. Patient is seen for OTV, fx 6/15.Feels well. Skin care reviewed. Presents with her daughter today.  9/3/24 Patient seen for OTV, fx10/15. Feels well. G0 erythema or hyperpigmentation R breast. Using skin care purchased (does not remember name)

## 2024-09-03 NOTE — DISEASE MANAGEMENT
[Pathological] : TNM Stage: p [0] : 0 [TTNM] : is [NTNM] : 0 [MTNM] : 0 [de-identified] : 8570 [de-identified] : 0160 [de-identified] : right breast

## 2024-09-03 NOTE — DISEASE MANAGEMENT
[Pathological] : TNM Stage: p [0] : 0 [TTNM] : is [NTNM] : 0 [MTNM] : 0 [de-identified] : 7579 [de-identified] : 6985 [de-identified] : right breast

## 2024-09-03 NOTE — REASON FOR VISIT
[Routine On-Treatment] : a routine on-treatment visit for [Breast Cancer] : breast cancer [Interpreters_IDNumber] : [id number] [Interpreters_FullName] : Andrez [TWNoteComboBox1] : Turkish

## 2024-09-03 NOTE — REASON FOR VISIT
[Routine On-Treatment] : a routine on-treatment visit for [Breast Cancer] : breast cancer [Interpreters_IDNumber] : [id number] [Interpreters_FullName] : Andrez [TWNoteComboBox1] : Wallisian

## 2024-09-04 PROCEDURE — G6017: CPT

## 2024-09-04 PROCEDURE — 77427 RADIATION TX MANAGEMENT X5: CPT

## 2024-09-04 PROCEDURE — G6012: CPT

## 2024-09-05 PROCEDURE — G6017: CPT

## 2024-09-05 PROCEDURE — G6012: CPT

## 2024-09-06 PROCEDURE — G6012: CPT

## 2024-09-06 PROCEDURE — 77417 THER RADIOLOGY PORT IMAGE(S): CPT

## 2024-09-09 PROCEDURE — G6012: CPT

## 2024-09-09 PROCEDURE — G6017: CPT

## 2024-09-10 ENCOUNTER — NON-APPOINTMENT (OUTPATIENT)
Age: 54
End: 2024-09-10

## 2024-09-10 VITALS
HEIGHT: 59 IN | HEART RATE: 75 BPM | BODY MASS INDEX: 28.43 KG/M2 | OXYGEN SATURATION: 98 % | WEIGHT: 141 LBS | RESPIRATION RATE: 16 BRPM

## 2024-09-10 PROCEDURE — G6017: CPT

## 2024-09-10 PROCEDURE — G6012: CPT

## 2024-09-10 PROCEDURE — 77336 RADIATION PHYSICS CONSULT: CPT

## 2024-09-10 NOTE — DISEASE MANAGEMENT
[Pathological] : TNM Stage: p [0] : 0 [TTNM] : is [NTNM] : 0 [MTNM] : 0 [de-identified] : 6568 [de-identified] : 1275 [de-identified] : right breast

## 2024-09-10 NOTE — HISTORY OF PRESENT ILLNESS
[FreeTextEntry1] : The patient is a pleasant 53 year old Belgian speaking female from [facility], diagnosed with right breast DCIS referred by Dr. Moreland.  B/l mammogram 5/6/24 showed  heterogeneously dense breast, - new calcifications in R breast 12:00  Right diagnostic mammogram 5/13/24 - focal heterogenous calcifications in R breast 12:00; R stereotactic bx - BIRADS 4B  Stereotactic biopsy 5/17/24 - R breast 12:00, DCIS, intermediate grade, cribriform pattern, stromal fibrosis and cyst formation. ER 98%, CO 80%  B/l US 6/3/24 - 0.7 cm nodule in R breast 12:00 N3 at biopsied cancer; clip not seen; correlate with MRI, possible US bx - 1.1 cm nodule R breast 10:00 N6, biopsied - 0.57 cm nodule in R breast 9:00 N6, new [ 6 mo R US]  Breast MRI 6/11/24 showed: - bx clip in R upper central breast at inferior aspect of nonmass enhancement which extends 2 cm superiorly and posterior, consistent with post-bx changes and residual disease and residual calcs - no axillary lymphadenopathy  R lumpectomy 6/28/24 by Dr. Moreland. Surgical pathology demonstrated: DCIS, 15 mm size, present in 3/23 blocks, Cribriform, Grade II. "comedo" necrosis and microcalcifications present.  All margins negative and > 10mm. Lymph nodes not submitted. ER 98%, CO 80% positive.  She will be due for a right limited US in December 2024 for a right breast nodule at 9:00.  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 She had a hysterectomy 13 years ago for heavy bleeding and fibroids. She lives in [facility] [place] with her  Karlos who works in housekeeping at [employer]. They are from [facility] and care for their 14 yo autistic grandson who is living in their house.  She reports right breast pain and presents with her  to discuss the role of radiation therapy in her care. She states she is not interested in taking Tamoxifen and opted to begin RT.  8/20/24 Patient is seen for OTV, fx 1/20 . Feels well. Trying reflexology today. Provided with skin care samples and instructions.  8/27/24. Patient is seen for OTV, fx 6/15.Feels well. Skin care reviewed. Presents with her daughter today.  9/3/24 Patient seen for OTV, fx10/15. Feels well. G0 erythema or hyperpigmentation R breast. Using skin care purchased (does not remember name)  9/10/24 Patient seen for OTV, fx 15/15 Feels well. G1 erythema R breast. No desquamtion. Reflexology

## 2024-09-10 NOTE — REASON FOR VISIT
[Routine On-Treatment] : a routine on-treatment visit for [Breast Cancer] : breast cancer [Interpreters_IDNumber] : [id number] [Interpreters_FullName] : Andrez [TWNoteComboBox1] : Micronesian

## 2024-09-10 NOTE — PHYSICAL EXAM
[Normal] : well developed, well nourished, in no acute distress [de-identified] : G1 erythema R breast

## 2024-09-24 ENCOUNTER — APPOINTMENT (OUTPATIENT)
Dept: HEMATOLOGY ONCOLOGY | Facility: CLINIC | Age: 54
End: 2024-09-24
Payer: COMMERCIAL

## 2024-09-24 VITALS
SYSTOLIC BLOOD PRESSURE: 117 MMHG | HEART RATE: 71 BPM | BODY MASS INDEX: 28.88 KG/M2 | HEIGHT: 59 IN | TEMPERATURE: 98.2 F | WEIGHT: 143.25 LBS | DIASTOLIC BLOOD PRESSURE: 72 MMHG | OXYGEN SATURATION: 99 %

## 2024-09-24 DIAGNOSIS — D05.11 INTRADUCTAL CARCINOMA IN SITU OF RIGHT BREAST: ICD-10-CM

## 2024-09-24 PROCEDURE — 99214 OFFICE O/P EST MOD 30 MIN: CPT

## 2024-09-24 PROCEDURE — G2211 COMPLEX E/M VISIT ADD ON: CPT

## 2024-09-24 NOTE — RESULTS/DATA
[FreeTextEntry1] : #Right breast DCIS- ER/GA+, pTisNx Diagnosed on screening mammogram on 5/6/24. S/p right breast biopsy 5/17/24 which revealed DCIS, intermediate grade, w/ necrosis and calcifications, ER 98%, GA 80%.  Breast MRI on 6/11/24 revealed 2cm of NME in the right breast.  She is now s/p R lumpectomy with Dr. Shelby Moreland on 6/28/24 - DCIS, intermediate grade with central necrosis, measuring 15mm, margins negative, pTisNx.  She has an upcoming appointment with Dr. Arianna Orta from radiation oncology on 8/2/24.  Referred to medical oncology today to discuss adjuvant therapy.  At today's visit we discussed management of high risk breast disorders including ADH, ALH, LCIS and DCIS. We discussed her risk of breast abnormalities in the future including additional high risk lesions or invasive breast cancer which is about twice the risk of the average population.  Options for risk reduction were reviewed. Given her intermediate grade disease with necrosis, recommend full dose tamoxifen (20mg PO daily x 5 years) which provides ~50% reduction in future breast events.  We discussed treatment with tamoxifen at 20mg PO daily x 5 years. Discussed major side effects of tamoxifen such as increased DVT/thrombosis risk, uterine cancer risk and menstrual changes including change in frequency and duration, hot flashes and vaginal dryness. She is s/p hysterectomy for fibroids. She does not take any antidepressants. She does not smoke and has no personal or family hx of DVT/PE/stroke.  Would not recommend arimidex at this time given unclear menopausal status (does not recall if her ovaries were previously removed).   She completed RT to the R breast in 15 fractions on 9/10/24 w/ Dr. Orta. She understands the next step in her treatment will be to start tamoxifen 20mg daily x 5 years, will start in 1 week. Risks/benefits/side effects discussed in detail.  She will continue regular breast imaging and follow up with breast surgery.  DEXA 7/2024- osteopenia. Continue vitamin D3 2000 units daily.  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  Clinically ROSALIE at this time. Overall feeling well. Signs/sx of breast cancer recurrence reviewed including persistent headaches, unintentional weight loss, or new bone pains that persist. The patient will reach out if any of these signs/sx develop prior to their next scheduled visit.  All patient questions answered at today's visit. Patient urged to call the office with any questions or concerns.  I personally have spent a total of 35 minutes of time on the date of this encounter reviewing test results, documenting findings, coordinating care and directly consulting with the patient and/or designated family member. Greater than 50% of the face to face encounter time was spent on counseling and/or coordination of care for right breast DCIS.

## 2024-09-24 NOTE — RESULTS/DATA
[FreeTextEntry1] : #Right breast DCIS- ER/KY+, pTisNx Diagnosed on screening mammogram on 5/6/24. S/p right breast biopsy 5/17/24 which revealed DCIS, intermediate grade, w/ necrosis and calcifications, ER 98%, KY 80%.  Breast MRI on 6/11/24 revealed 2cm of NME in the right breast.  She is now s/p R lumpectomy with Dr. Shelby Moreland on 6/28/24 - DCIS, intermediate grade with central necrosis, measuring 15mm, margins negative, pTisNx.  She has an upcoming appointment with Dr. Arianna Orta from radiation oncology on 8/2/24.  Referred to medical oncology today to discuss adjuvant therapy.  At today's visit we discussed management of high risk breast disorders including ADH, ALH, LCIS and DCIS. We discussed her risk of breast abnormalities in the future including additional high risk lesions or invasive breast cancer which is about twice the risk of the average population.  Options for risk reduction were reviewed. Given her intermediate grade disease with necrosis, recommend full dose tamoxifen (20mg PO daily x 5 years) which provides ~50% reduction in future breast events.  We discussed treatment with tamoxifen at 20mg PO daily x 5 years. Discussed major side effects of tamoxifen such as increased DVT/thrombosis risk, uterine cancer risk and menstrual changes including change in frequency and duration, hot flashes and vaginal dryness. She is s/p hysterectomy for fibroids. She does not take any antidepressants. She does not smoke and has no personal or family hx of DVT/PE/stroke.  Would not recommend arimidex at this time given unclear menopausal status (does not recall if her ovaries were previously removed).   She completed RT to the R breast in 15 fractions on 9/10/24 w/ Dr. Orta. She understands the next step in her treatment will be to start tamoxifen 20mg daily x 5 years, will start in 1 week. Risks/benefits/side effects discussed in detail.  She will continue regular breast imaging and follow up with breast surgery.  DEXA 7/2024- osteopenia. Continue vitamin D3 2000 units daily.  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  Clinically ROSALIE at this time. Overall feeling well. Signs/sx of breast cancer recurrence reviewed including persistent headaches, unintentional weight loss, or new bone pains that persist. The patient will reach out if any of these signs/sx develop prior to their next scheduled visit.  All patient questions answered at today's visit. Patient urged to call the office with any questions or concerns.  I personally have spent a total of 35 minutes of time on the date of this encounter reviewing test results, documenting findings, coordinating care and directly consulting with the patient and/or designated family member. Greater than 50% of the face to face encounter time was spent on counseling and/or coordination of care for right breast DCIS.

## 2024-09-24 NOTE — PHYSICAL EXAM
[Fully active, able to carry on all pre-disease performance without restriction] : Status 0 - Fully active, able to carry on all pre-disease performance without restriction [Normal] : affect appropriate [de-identified] : generally well appearing  female, NAD, pleasant  [de-identified] : s/p R lumpectomy, scar tissue vs. seroma palpable in upper right breast, mild erythema of R breast, no masses or LAD bilaterally

## 2024-09-24 NOTE — PHYSICAL EXAM
[Fully active, able to carry on all pre-disease performance without restriction] : Status 0 - Fully active, able to carry on all pre-disease performance without restriction [Normal] : affect appropriate [de-identified] : generally well appearing  female, NAD, pleasant  [de-identified] : s/p R lumpectomy, scar tissue vs. seroma palpable in upper right breast, mild erythema of R breast, no masses or LAD bilaterally

## 2024-09-24 NOTE — HISTORY OF PRESENT ILLNESS
[de-identified] : Referred by: Dr. Shelby Moreland  PCP: Dr. Melvin Miranda Diagnosis: Right breast DCIS  Karlos Mackey (spouse) - ph [phone] Alysia Lyn (daughter) - ph [phone]   Gayle Mackey is a post-menopausal female who presented at age 53 in 2024 for evaluation of right breast DCIS.  The patient has a medical history of HTN, chronic headaches (getting worse, occurring @ hs).  No personal/family hx of VTE. Surgical hx:  Right lumpectomy, hysterectomy (for fibroids - ?), left hand surgery (laceration repair).   Gayle underwent screening mammogram on 24 which revealed calcifications in the right breast, confirmed on diagnostic imaging. She denied any breast complaints at that time including breast mass, nipple discharge or breast pain. Right breast biopsy was performed on 24 and revealed DCIS, intermediate grade, w/ necrosis and calcifications, ER 98%, MT 80%. Breast MRI on 24 revealed 2cm of NME in the right breast. She is now s/p R lumpectomy with Dr. Shelby Moreland on 24 and final pathology was notable for DCIS, intermediate grade with central necrosis, focal retrograde lobular extension, measuring 15mm, margins negative, pTisNx.  She has an upcoming appointment with Dr. Arianna Orta from radiation oncology on 24.  Referred to medical oncology today to discuss adjuvant therapy.   At today's visit she is feeling very well and is in her usual state of health. Denies recent headaches, visual changes, balance issues, CP, cough, SOB, n/v/d, constipation, unintentional weight loss or new bone or back pain.  Imaging reviewed: Screening mammogram 24- new calcifications in the right breast, BIRADS 0 Diagnostic right MMG 24- focal calcifications in the right breast, rec bx, BIRADS 4B MRI breast 24- biopsy clip in the right breast, NME extends approx 2cm superiorly and posteriorly, no contralateral disease or LAD  Pathology reviewed: Right breast biopsy 24- DCIS, intermediate grade, w/ necrosis and calcifications  ER 98%, MT 80% Right lumpectomy 24- DCIS, intermediate grade with central necrosis, focal retrograde lobular extension, measuring 15mm, margins negative pTisNx  Genetics: [employer] panel 24- 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, one pathogenic variant identified, MSH3 heterozygous   HCM: - Colonoscopy: 2023- internal hemorrhoids  - Gyn/pap:  2024 - no hx abnormal Paps - Mammo:  as above - Lung cancer screen:  n/a - DEXA:  n/a   SH: - 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) - Living situation:  Lives in [place] w/ spouse - Smoking/etoh/illicits:  Never smoker, no etoh, no illicits  - Exercise:  none   OB/GYN Hx: - Age of menarche:  12 - Age of menopause:  hyst for fibroids @ 38 - Pregnancy history:   LC2 - Age at live birth: 16 - OCP use:  used OCPs x3 yrs, Norplant x4 yrs - Fertility treatments:  n/a - Hormonal therapy: n/a - Breast feeding history:  x3 yrs   FH: - mother - lung cancer @ 40 (smoker) - mat aunt - colon cancer - mat uncle - skin cancer - mat uncle - stomach cancer - father - kidney disease (on dialysis) [de-identified] : Gayle presents today for follow up for right breast DCIS on 9/24/24.   At today's visit Gayle is generally doing well. She completed RT to the R breast in 15 fractions on 9/10/24 w/ Dr. Orta. She picked up tamoxifen from the pharmacy but has not yet started taking it. She reports occasional tingling pains in the right breast. Here to discuss tamoxifen as the next step in her treatment plan. She notices a gap in the breast tissue at the lumpectomy site. Denies recent headaches, visual changes, balance issues, CP, cough, SOB, n/v/d, constipation, unintentional weight loss or new bone or back pain. DEXA 7/31/24- osteopenia, she is taking vitamin D.

## 2024-09-24 NOTE — HISTORY OF PRESENT ILLNESS
[de-identified] : Referred by: Dr. Shelby Moreland  PCP: Dr. Melvin Miranda Diagnosis: Right breast DCIS  Karlos Mackey (spouse) - ph [phone] Alysia Lyn (daughter) - ph [phone]   Gayle Mackey is a post-menopausal female who presented at age 53 in 2024 for evaluation of right breast DCIS.  The patient has a medical history of HTN, chronic headaches (getting worse, occurring @ hs).  No personal/family hx of VTE. Surgical hx:  Right lumpectomy, hysterectomy (for fibroids - ?), left hand surgery (laceration repair).   Gayle underwent screening mammogram on 24 which revealed calcifications in the right breast, confirmed on diagnostic imaging. She denied any breast complaints at that time including breast mass, nipple discharge or breast pain. Right breast biopsy was performed on 24 and revealed DCIS, intermediate grade, w/ necrosis and calcifications, ER 98%, CO 80%. Breast MRI on 24 revealed 2cm of NME in the right breast. She is now s/p R lumpectomy with Dr. Shelby Moreland on 24 and final pathology was notable for DCIS, intermediate grade with central necrosis, focal retrograde lobular extension, measuring 15mm, margins negative, pTisNx.  She has an upcoming appointment with Dr. Arianna Orta from radiation oncology on 24.  Referred to medical oncology today to discuss adjuvant therapy.   At today's visit she is feeling very well and is in her usual state of health. Denies recent headaches, visual changes, balance issues, CP, cough, SOB, n/v/d, constipation, unintentional weight loss or new bone or back pain.  Imaging reviewed: Screening mammogram 24- new calcifications in the right breast, BIRADS 0 Diagnostic right MMG 24- focal calcifications in the right breast, rec bx, BIRADS 4B MRI breast 24- biopsy clip in the right breast, NME extends approx 2cm superiorly and posteriorly, no contralateral disease or LAD  Pathology reviewed: Right breast biopsy 24- DCIS, intermediate grade, w/ necrosis and calcifications  ER 98%, CO 80% Right lumpectomy 24- DCIS, intermediate grade with central necrosis, focal retrograde lobular extension, measuring 15mm, margins negative pTisNx  Genetics: [employer] panel 24- 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, one pathogenic variant identified, MSH3 heterozygous   HCM: - Colonoscopy: 2023- internal hemorrhoids  - Gyn/pap:  2024 - no hx abnormal Paps - Mammo:  as above - Lung cancer screen:  n/a - DEXA:  n/a   SH: - 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) - Living situation:  Lives in [place] w/ spouse - Smoking/etoh/illicits:  Never smoker, no etoh, no illicits  - Exercise:  none   OB/GYN Hx: - Age of menarche:  12 - Age of menopause:  hyst for fibroids @ 38 - Pregnancy history:   LC2 - Age at live birth: 16 - OCP use:  used OCPs x3 yrs, Norplant x4 yrs - Fertility treatments:  n/a - Hormonal therapy: n/a - Breast feeding history:  x3 yrs   FH: - mother - lung cancer @ 40 (smoker) - mat aunt - colon cancer - mat uncle - skin cancer - mat uncle - stomach cancer - father - kidney disease (on dialysis) [de-identified] : Gayle presents today for follow up for right breast DCIS on 9/24/24.   At today's visit Gayle is generally doing well. She completed RT to the R breast in 15 fractions on 9/10/24 w/ Dr. Orta. She picked up tamoxifen from the pharmacy but has not yet started taking it. She reports occasional tingling pains in the right breast. Here to discuss tamoxifen as the next step in her treatment plan. She notices a gap in the breast tissue at the lumpectomy site. Denies recent headaches, visual changes, balance issues, CP, cough, SOB, n/v/d, constipation, unintentional weight loss or new bone or back pain. DEXA 7/31/24- osteopenia, she is taking vitamin D.

## 2024-10-10 ENCOUNTER — APPOINTMENT (OUTPATIENT)
Dept: RADIATION ONCOLOGY | Facility: CLINIC | Age: 54
End: 2024-10-10

## 2024-10-10 VITALS
WEIGHT: 141 LBS | HEART RATE: 68 BPM | HEIGHT: 59 IN | BODY MASS INDEX: 28.43 KG/M2 | RESPIRATION RATE: 16 BRPM | OXYGEN SATURATION: 98 %

## 2024-10-10 DIAGNOSIS — D05.11 INTRADUCTAL CARCINOMA IN SITU OF RIGHT BREAST: ICD-10-CM

## 2024-10-10 PROCEDURE — 99214 OFFICE O/P EST MOD 30 MIN: CPT | Mod: 25

## 2024-10-31 ENCOUNTER — APPOINTMENT (OUTPATIENT)
Dept: BREAST CENTER | Facility: CLINIC | Age: 54
End: 2024-10-31
Payer: COMMERCIAL

## 2024-10-31 ENCOUNTER — RX RENEWAL (OUTPATIENT)
Age: 54
End: 2024-10-31

## 2024-10-31 VITALS
HEIGHT: 59 IN | BODY MASS INDEX: 28.43 KG/M2 | SYSTOLIC BLOOD PRESSURE: 106 MMHG | HEART RATE: 68 BPM | DIASTOLIC BLOOD PRESSURE: 72 MMHG | WEIGHT: 141 LBS

## 2024-10-31 DIAGNOSIS — D05.11 INTRADUCTAL CARCINOMA IN SITU OF RIGHT BREAST: ICD-10-CM

## 2024-10-31 DIAGNOSIS — R92.8 OTHER ABNORMAL AND INCONCLUSIVE FINDINGS ON DIAGNOSTIC IMAGING OF BREAST: ICD-10-CM

## 2024-10-31 PROCEDURE — 99214 OFFICE O/P EST MOD 30 MIN: CPT

## 2024-11-29 ENCOUNTER — APPOINTMENT (OUTPATIENT)
Dept: FAMILY MEDICINE | Facility: CLINIC | Age: 54
End: 2024-11-29
Payer: COMMERCIAL

## 2024-11-29 VITALS
HEART RATE: 70 BPM | TEMPERATURE: 97.9 F | WEIGHT: 141 LBS | DIASTOLIC BLOOD PRESSURE: 60 MMHG | OXYGEN SATURATION: 97 % | BODY MASS INDEX: 28.43 KG/M2 | HEIGHT: 59 IN | SYSTOLIC BLOOD PRESSURE: 100 MMHG

## 2024-11-29 DIAGNOSIS — I10 ESSENTIAL (PRIMARY) HYPERTENSION: ICD-10-CM

## 2024-11-29 PROCEDURE — 99214 OFFICE O/P EST MOD 30 MIN: CPT

## 2024-12-03 ENCOUNTER — OUTPATIENT (OUTPATIENT)
Dept: OUTPATIENT SERVICES | Facility: HOSPITAL | Age: 54
LOS: 1 days | End: 2024-12-03
Payer: COMMERCIAL

## 2024-12-03 ENCOUNTER — APPOINTMENT (OUTPATIENT)
Dept: MAMMOGRAPHY | Facility: CLINIC | Age: 54
End: 2024-12-03
Payer: COMMERCIAL

## 2024-12-03 ENCOUNTER — RESULT REVIEW (OUTPATIENT)
Age: 54
End: 2024-12-03

## 2024-12-03 ENCOUNTER — APPOINTMENT (OUTPATIENT)
Dept: ULTRASOUND IMAGING | Facility: CLINIC | Age: 54
End: 2024-12-03
Payer: COMMERCIAL

## 2024-12-03 DIAGNOSIS — Z00.8 ENCOUNTER FOR OTHER GENERAL EXAMINATION: ICD-10-CM

## 2024-12-03 DIAGNOSIS — D25.9 LEIOMYOMA OF UTERUS, UNSPECIFIED: Chronic | ICD-10-CM

## 2024-12-03 DIAGNOSIS — Z98.890 OTHER SPECIFIED POSTPROCEDURAL STATES: Chronic | ICD-10-CM

## 2024-12-03 PROCEDURE — 76642 ULTRASOUND BREAST LIMITED: CPT

## 2024-12-03 PROCEDURE — 76642 ULTRASOUND BREAST LIMITED: CPT | Mod: 26,RT

## 2024-12-05 DIAGNOSIS — Z85.3 PERSONAL HISTORY OF MALIGNANT NEOPLASM OF BREAST: ICD-10-CM

## 2024-12-05 DIAGNOSIS — Z12.39 ENCOUNTER FOR OTHER SCREENING FOR MALIGNANT NEOPLASM OF BREAST: ICD-10-CM

## 2025-01-07 ENCOUNTER — NON-APPOINTMENT (OUTPATIENT)
Age: 55
End: 2025-01-07

## 2025-01-07 ENCOUNTER — OUTPATIENT (OUTPATIENT)
Dept: OUTPATIENT SERVICES | Facility: HOSPITAL | Age: 55
LOS: 1 days | Discharge: HOME | End: 2025-01-07

## 2025-01-07 ENCOUNTER — APPOINTMENT (OUTPATIENT)
Dept: HEMATOLOGY ONCOLOGY | Facility: CLINIC | Age: 55
End: 2025-01-07
Payer: COMMERCIAL

## 2025-01-07 VITALS
BODY MASS INDEX: 29.23 KG/M2 | WEIGHT: 145 LBS | SYSTOLIC BLOOD PRESSURE: 121 MMHG | DIASTOLIC BLOOD PRESSURE: 75 MMHG | TEMPERATURE: 98 F | OXYGEN SATURATION: 99 % | HEART RATE: 77 BPM | HEIGHT: 59 IN

## 2025-01-07 DIAGNOSIS — Z98.890 OTHER SPECIFIED POSTPROCEDURAL STATES: Chronic | ICD-10-CM

## 2025-01-07 DIAGNOSIS — D05.11 INTRADUCTAL CARCINOMA IN SITU OF RIGHT BREAST: ICD-10-CM

## 2025-01-07 DIAGNOSIS — D25.9 LEIOMYOMA OF UTERUS, UNSPECIFIED: Chronic | ICD-10-CM

## 2025-01-07 PROCEDURE — 99214 OFFICE O/P EST MOD 30 MIN: CPT

## 2025-01-13 ENCOUNTER — NON-APPOINTMENT (OUTPATIENT)
Age: 55
End: 2025-01-13

## 2025-02-03 ENCOUNTER — APPOINTMENT (OUTPATIENT)
Dept: BREAST CENTER | Facility: CLINIC | Age: 55
End: 2025-02-03
Payer: COMMERCIAL

## 2025-02-03 VITALS
DIASTOLIC BLOOD PRESSURE: 72 MMHG | HEIGHT: 59 IN | WEIGHT: 145 LBS | HEART RATE: 72 BPM | SYSTOLIC BLOOD PRESSURE: 108 MMHG | BODY MASS INDEX: 29.23 KG/M2

## 2025-02-03 DIAGNOSIS — D05.11 INTRADUCTAL CARCINOMA IN SITU OF RIGHT BREAST: ICD-10-CM

## 2025-02-03 DIAGNOSIS — Z12.39 ENCOUNTER FOR OTHER SCREENING FOR MALIGNANT NEOPLASM OF BREAST: ICD-10-CM

## 2025-02-03 PROCEDURE — 99214 OFFICE O/P EST MOD 30 MIN: CPT

## 2025-02-05 ENCOUNTER — NON-APPOINTMENT (OUTPATIENT)
Age: 55
End: 2025-02-05

## 2025-02-19 NOTE — REVIEW OF SYSTEMS
Routed to provider to review and refill if appropriate  Last filled 10/21/24 -- by PCP for the last few refills, but we used to fill for patient  Next appt 3/10/25 with MR   [Negative] : Heme/Lymph

## 2025-02-24 ENCOUNTER — APPOINTMENT (OUTPATIENT)
Dept: FAMILY MEDICINE | Facility: CLINIC | Age: 55
End: 2025-02-24
Payer: COMMERCIAL

## 2025-02-24 ENCOUNTER — NON-APPOINTMENT (OUTPATIENT)
Age: 55
End: 2025-02-24

## 2025-02-24 DIAGNOSIS — I10 ESSENTIAL (PRIMARY) HYPERTENSION: ICD-10-CM

## 2025-02-24 DIAGNOSIS — Z00.00 ENCOUNTER FOR GENERAL ADULT MEDICAL EXAMINATION W/OUT ABNORMAL FINDINGS: ICD-10-CM

## 2025-02-24 DIAGNOSIS — D05.11 INTRADUCTAL CARCINOMA IN SITU OF RIGHT BREAST: ICD-10-CM

## 2025-02-24 DIAGNOSIS — Z23 ENCOUNTER FOR IMMUNIZATION: ICD-10-CM

## 2025-02-24 PROCEDURE — 99173 VISUAL ACUITY SCREEN: CPT

## 2025-02-24 PROCEDURE — 99396 PREV VISIT EST AGE 40-64: CPT | Mod: 25

## 2025-02-24 PROCEDURE — 92551 PURE TONE HEARING TEST AIR: CPT

## 2025-02-24 PROCEDURE — 93000 ELECTROCARDIOGRAM COMPLETE: CPT

## 2025-02-24 PROCEDURE — 90632 HEPA VACCINE ADULT IM: CPT

## 2025-02-24 PROCEDURE — 90471 IMMUNIZATION ADMIN: CPT

## 2025-02-24 PROCEDURE — 36415 COLL VENOUS BLD VENIPUNCTURE: CPT

## 2025-02-25 LAB
ALBUMIN SERPL ELPH-MCNC: 4.2 G/DL
ALP BLD-CCNC: 78 U/L
ALT SERPL-CCNC: 14 U/L
ANION GAP SERPL CALC-SCNC: 13 MMOL/L
AST SERPL-CCNC: 14 U/L
BASOPHILS # BLD AUTO: 0.04 K/UL
BASOPHILS NFR BLD AUTO: 0.9 %
BILIRUB SERPL-MCNC: <0.2 MG/DL
BUN SERPL-MCNC: 12 MG/DL
CALCIUM SERPL-MCNC: 9.2 MG/DL
CHLORIDE SERPL-SCNC: 107 MMOL/L
CHOLEST SERPL-MCNC: 154 MG/DL
CO2 SERPL-SCNC: 22 MMOL/L
CREAT SERPL-MCNC: 0.54 MG/DL
EGFR: 109 ML/MIN/1.73M2
EOSINOPHIL # BLD AUTO: 0.56 K/UL
EOSINOPHIL NFR BLD AUTO: 12.2 %
GLUCOSE SERPL-MCNC: 88 MG/DL
HCT VFR BLD CALC: 39.4 %
HDLC SERPL-MCNC: 41 MG/DL
HGB BLD-MCNC: 11.8 G/DL
IMM GRANULOCYTES NFR BLD AUTO: 0.2 %
LDLC SERPL CALC-MCNC: 80 MG/DL
LYMPHOCYTES # BLD AUTO: 1.05 K/UL
LYMPHOCYTES NFR BLD AUTO: 22.9 %
MAN DIFF?: NORMAL
MCHC RBC-ENTMCNC: 27.1 PG
MCHC RBC-ENTMCNC: 29.9 G/DL
MCV RBC AUTO: 90.4 FL
MONOCYTES # BLD AUTO: 0.29 K/UL
MONOCYTES NFR BLD AUTO: 6.3 %
NEUTROPHILS # BLD AUTO: 2.64 K/UL
NEUTROPHILS NFR BLD AUTO: 57.5 %
NONHDLC SERPL-MCNC: 114 MG/DL
PLATELET # BLD AUTO: 202 K/UL
POTASSIUM SERPL-SCNC: 4 MMOL/L
PROT SERPL-MCNC: 6.6 G/DL
RBC # BLD: 4.36 M/UL
RBC # FLD: 13.6 %
SODIUM SERPL-SCNC: 142 MMOL/L
T3FREE SERPL-MCNC: 3.04 PG/ML
T4 FREE SERPL-MCNC: 1.2 NG/DL
TRIGL SERPL-MCNC: 200 MG/DL
TSH SERPL-ACNC: 0.72 UIU/ML
WBC # FLD AUTO: 4.59 K/UL

## 2025-04-17 ENCOUNTER — NON-APPOINTMENT (OUTPATIENT)
Age: 55
End: 2025-04-17

## 2025-04-25 ENCOUNTER — RX RENEWAL (OUTPATIENT)
Age: 55
End: 2025-04-25

## 2025-05-15 ENCOUNTER — APPOINTMENT (OUTPATIENT)
Dept: ULTRASOUND IMAGING | Facility: CLINIC | Age: 55
End: 2025-05-15
Payer: COMMERCIAL

## 2025-05-15 ENCOUNTER — RESULT REVIEW (OUTPATIENT)
Age: 55
End: 2025-05-15

## 2025-05-15 ENCOUNTER — OUTPATIENT (OUTPATIENT)
Dept: OUTPATIENT SERVICES | Facility: HOSPITAL | Age: 55
LOS: 1 days | End: 2025-05-15
Payer: COMMERCIAL

## 2025-05-15 ENCOUNTER — APPOINTMENT (OUTPATIENT)
Dept: MAMMOGRAPHY | Facility: CLINIC | Age: 55
End: 2025-05-15
Payer: COMMERCIAL

## 2025-05-15 DIAGNOSIS — D25.9 LEIOMYOMA OF UTERUS, UNSPECIFIED: Chronic | ICD-10-CM

## 2025-05-15 DIAGNOSIS — Z85.3 PERSONAL HISTORY OF MALIGNANT NEOPLASM OF BREAST: ICD-10-CM

## 2025-05-15 DIAGNOSIS — Z12.39 ENCOUNTER FOR OTHER SCREENING FOR MALIGNANT NEOPLASM OF BREAST: ICD-10-CM

## 2025-05-15 DIAGNOSIS — Z98.890 OTHER SPECIFIED POSTPROCEDURAL STATES: Chronic | ICD-10-CM

## 2025-05-15 PROCEDURE — 76641 ULTRASOUND BREAST COMPLETE: CPT

## 2025-05-15 PROCEDURE — 76641 ULTRASOUND BREAST COMPLETE: CPT | Mod: 26,50

## 2025-05-15 PROCEDURE — G0279: CPT | Mod: 26

## 2025-05-15 PROCEDURE — 77066 DX MAMMO INCL CAD BI: CPT | Mod: 26

## 2025-05-15 PROCEDURE — G0279: CPT

## 2025-05-15 PROCEDURE — 77066 DX MAMMO INCL CAD BI: CPT

## 2025-06-26 ENCOUNTER — APPOINTMENT (OUTPATIENT)
Dept: FAMILY MEDICINE | Facility: CLINIC | Age: 55
End: 2025-06-26
Payer: COMMERCIAL

## 2025-06-26 VITALS
OXYGEN SATURATION: 97 % | BODY MASS INDEX: 29.29 KG/M2 | DIASTOLIC BLOOD PRESSURE: 70 MMHG | HEART RATE: 71 BPM | SYSTOLIC BLOOD PRESSURE: 118 MMHG | WEIGHT: 145 LBS | TEMPERATURE: 98.3 F

## 2025-06-26 PROCEDURE — 99214 OFFICE O/P EST MOD 30 MIN: CPT

## 2025-07-03 ENCOUNTER — APPOINTMENT (OUTPATIENT)
Dept: CARDIOLOGY | Facility: CLINIC | Age: 55
End: 2025-07-03
Payer: COMMERCIAL

## 2025-07-03 VITALS
HEIGHT: 59 IN | HEART RATE: 85 BPM | OXYGEN SATURATION: 99 % | SYSTOLIC BLOOD PRESSURE: 120 MMHG | WEIGHT: 134 LBS | DIASTOLIC BLOOD PRESSURE: 74 MMHG | BODY MASS INDEX: 27.01 KG/M2

## 2025-07-03 PROCEDURE — 93000 ELECTROCARDIOGRAM COMPLETE: CPT

## 2025-07-03 PROCEDURE — 99203 OFFICE O/P NEW LOW 30 MIN: CPT

## 2025-07-03 PROCEDURE — G2211 COMPLEX E/M VISIT ADD ON: CPT

## 2025-07-08 RX ORDER — TAMOXIFEN CITRATE 20 MG/1
20 TABLET, FILM COATED ORAL DAILY
Qty: 90 | Refills: 3 | Status: ACTIVE | COMMUNITY
Start: 1900-01-01 | End: 1900-01-01

## (undated) DEVICE — DRSG ACE BANDAGE 2"

## (undated) DEVICE — DRAPE C ARM 41X140"

## (undated) DEVICE — TOURNIQUET ESMARK 4"

## (undated) DEVICE — SUT SOFSILK 3-0 18" C-14

## (undated) DEVICE — SUT MONOSOF 5-0 18" P-13

## (undated) DEVICE — PACK UPPER EXTREMITY

## (undated) DEVICE — SPONGE OPHTHALMIC ALCON SPEAR

## (undated) DEVICE — VENODYNE/SCD SLEEVE CALF MEDIUM

## (undated) DEVICE — SUT NYLON 8-0 5" DRM6

## (undated) DEVICE — SUT MONOSOF 7-0 18" P-16

## (undated) DEVICE — GLV 7.5 PROTEXIS (WHITE)

## (undated) DEVICE — BLADE SCALPEL SAFETYLOCK #15

## (undated) DEVICE — GLV 8 PROTEXIS (BLUE)

## (undated) DEVICE — GLV 7 PROTEXIS (WHITE)

## (undated) DEVICE — WARMING BLANKET LOWER ADULT

## (undated) DEVICE — TOURNIQUET CUFF 18" DUAL PORT SINGLE BLADDER W PLC  (BLACK)

## (undated) DEVICE — SOL IRR POUR NS 0.9% 500ML